# Patient Record
Sex: FEMALE | Race: WHITE
[De-identification: names, ages, dates, MRNs, and addresses within clinical notes are randomized per-mention and may not be internally consistent; named-entity substitution may affect disease eponyms.]

---

## 2020-05-13 ENCOUNTER — HOSPITAL ENCOUNTER (INPATIENT)
Dept: HOSPITAL 46 - MS | Age: 60
LOS: 8 days | Discharge: HOME | DRG: 340 | End: 2020-05-21
Attending: SURGERY | Admitting: SURGERY
Payer: COMMERCIAL

## 2020-05-13 VITALS — HEIGHT: 65 IN | BODY MASS INDEX: 34.16 KG/M2 | WEIGHT: 205.01 LBS

## 2020-05-13 DIAGNOSIS — Z79.899: ICD-10-CM

## 2020-05-13 DIAGNOSIS — K21.9: ICD-10-CM

## 2020-05-13 DIAGNOSIS — F41.8: ICD-10-CM

## 2020-05-13 DIAGNOSIS — E66.9: ICD-10-CM

## 2020-05-13 DIAGNOSIS — Z20.828: ICD-10-CM

## 2020-05-13 DIAGNOSIS — E87.6: ICD-10-CM

## 2020-05-13 DIAGNOSIS — E86.0: ICD-10-CM

## 2020-05-13 DIAGNOSIS — K35.33: Primary | ICD-10-CM

## 2020-05-13 PROCEDURE — C9113 INJ PANTOPRAZOLE SODIUM, VIA: HCPCS

## 2020-05-13 PROCEDURE — U0002 COVID-19 LAB TEST NON-CDC: HCPCS

## 2020-05-13 PROCEDURE — G0378 HOSPITAL OBSERVATION PER HR: HCPCS

## 2020-05-13 NOTE — XMS
Encounter Summary
  Created on: 2020
 
 Sarah Escamilla
 External Reference #: [id number]
 : 60
 Sex: Female
 
 Demographics
 
 
+-----------------------+----------------------+
| Address               | [address] |
|                       | [place]  [address] |
+-----------------------+----------------------+
| Home Phone            | [phone]      |
+-----------------------+----------------------+
| Preferred Language    | Unknown              |
+-----------------------+----------------------+
| Marital Status        |               |
+-----------------------+----------------------+
| Hindu Affiliation | Unknown              |
+-----------------------+----------------------+
| Race                  | Unknown              |
+-----------------------+----------------------+
| Ethnic Group          | Unknown              |
+-----------------------+----------------------+
 
 
 Author
 
 
+--------------+--------------------------------------------+
| Author       | [facility]  |
|              | and [facility]                                |
+--------------+--------------------------------------------+
| Organization | [facility]  |
|              | and [place]                                |
+--------------+--------------------------------------------+
| Address      | Unknown                                    |
+--------------+--------------------------------------------+
| Phone        | Unavailable                                |
+--------------+--------------------------------------------+
 
 
 
 Support
 
 
+------------------+--------------+---------+-----------------+
| Name             | Relationship | Address | Phone           |
+------------------+--------------+---------+-----------------+
| Alvino Escamilla | ECON         | Unknown | [phone] |
+------------------+--------------+---------+-----------------+
 
 
 
 Care Team Providers
 
 
 
+-----------------------+------+-------------+
| Care Team Member Name | Role | Phone       |
+-----------------------+------+-------------+
 PCP  | Unavailable |
+-----------------------+------+-------------+
 
 
 
 Encounter Details
 
 
+--------+-----------+----------------------+-----------+-------------+
| Date   | Type      | Department           | Care Team | Description |
+--------+-----------+----------------------+-----------+-------------+
| / | Hospital  |   [facility] |           |             |
|    | Encounter |  MED [facility] |           |             |
|        |           |   [address]       |           |             |
|        |           | [place]      |           |             |
|        |           | [address]           |           |             |
|        |           | [phone]         |           |             |
+--------+-----------+----------------------+-----------+-------------+
 
 
 
 Social History
 
 
+----------------+-------+-----------+--------+------+
| Tobacco Use    | Types | Packs/Day | Years  | Date |
|                |       |           | Used   |      |
+----------------+-------+-----------+--------+------+
| Never Assessed |       |           |        |      |
+----------------+-------+-----------+--------+------+
 
 
 
+------------------+---------------+
| Sex Assigned at  | Date Recorded |
| Birth            |               |
+------------------+---------------+
| Not on file      |               |
+------------------+---------------+
 
 
 
+----------------+-------------+-------------+
| Job Start Date | Occupation  | Industry    |
+----------------+-------------+-------------+
| Not on file    | Not on file | Not on file |
+----------------+-------------+-------------+
 
 
 
+----------------+--------------+------------+
| Travel History | Travel Start | Travel End |
+----------------+--------------+------------+
 
 
 
 
+-------------------------------------+
| No recent travel history available. |
+-------------------------------------+
 documented as of this encounter
 
 Plan of Treatment
 Not on filedocumented as of this encounter
 
 Visit Diagnoses
 Not on filedocumented in this encounter

## 2020-05-13 NOTE — XMS
Encounter Summary
  Created on: 2020
 
 Sarah Escamilla
 External Reference #: [id number]
 : 60
 Sex: Female
 
 Demographics
 
 
+-----------------------+----------------------+
| Address               | [address] |
|                       | [place]  [address] |
+-----------------------+----------------------+
| Home Phone            | [phone]      |
+-----------------------+----------------------+
| Preferred Language    | Unknown              |
+-----------------------+----------------------+
| Marital Status        |               |
+-----------------------+----------------------+
| Yazdanism Affiliation | Unknown              |
+-----------------------+----------------------+
| Race                  | Unknown              |
+-----------------------+----------------------+
| Ethnic Group          | Unknown              |
+-----------------------+----------------------+
 
 
 Author
 
 
+--------------+--------------------------------------------+
| Author       | [facility] [place]  |
|              | and [place]                                |
+--------------+--------------------------------------------+
| Organization | [facility] [place]  |
|              | and [place]                                |
+--------------+--------------------------------------------+
| Address      | Unknown                                    |
+--------------+--------------------------------------------+
| Phone        | Unavailable                                |
+--------------+--------------------------------------------+
 
 
 
 Support
 
 
+------------------+--------------+---------+-----------------+
| Name             | Relationship | Address | Phone           |
+------------------+--------------+---------+-----------------+
| Alvino Escamilla | ECON         | Unknown | [phone] |
+------------------+--------------+---------+-----------------+
 
 
 
 Care Team Providers
 
 
 
+-----------------------+------+-----------------+
| Care Team Member Name | Role | Phone           |
+-----------------------+------+-----------------+
| Unknown, Doctor       | PCP  | [phone] |
+-----------------------+------+-----------------+
 
 
 
 Encounter Details
 
 
+--------+----------+----------------------+----------------------+-------------+
| Date   | Type     | Department           | Care Team            | Description |
+--------+----------+----------------------+----------------------+-------------+
| / | Imaging  |   [facility] |   Provider,          |             |
| 2017   | Exam     |  MED [facility] EXTERNAL    | [id number] |             |
|        |          | IMAGING  [address]       |  [address]        |             |
|        |          | [address] [place]     | [place] [id number]     |             |
|        |          | [place] [address] |                      |             |
|        |          |   [phone]       |                      |             |
+--------+----------+----------------------+----------------------+-------------+
 
 
 
 Social History
 
 
+----------------+-------+-----------+--------+------+
| Tobacco Use    | Types | Packs/Day | Years  | Date |
|                |       |           | Used   |      |
+----------------+-------+-----------+--------+------+
| Never Assessed |       |           |        |      |
+----------------+-------+-----------+--------+------+
 
 
 
+------------------+---------------+
| Sex Assigned at  | Date Recorded |
| Birth            |               |
+------------------+---------------+
| Not on file      |               |
+------------------+---------------+
 
 
 
+----------------+-------------+-------------+
| Job Start Date | Occupation  | Industry    |
+----------------+-------------+-------------+
| Not on file    | Not on file | Not on file |
+----------------+-------------+-------------+
 
 
 
+----------------+--------------+------------+
| Travel History | Travel Start | Travel End |
+----------------+--------------+------------+
 
 
 
 
+-------------------------------------+
| No recent travel history available. |
+-------------------------------------+
 documented as of this encounter
 
 Plan of Treatment
 Not on filedocumented as of this encounter
 
 Procedures
 
 
+---------------------+--------+-------------+----------------------+----------------------+
| Procedure Name      | Priori | Date/Time   | Associated Diagnosis | Comments             |
|                     | ty     |             |                      |                      |
+---------------------+--------+-------------+----------------------+----------------------+
| CLYDE DIGITAL         | Routin | 09/10/2014  |                      |   Results for this   |
| SCREENING BILATERAL | e      | 10:45 AM    |                      | procedure are in the |
|                     |        | PDT         |                      |  results section.    |
+---------------------+--------+-------------+----------------------+----------------------+
 documented in this encounter
 
 Results
 CLYDE Digital Screening Bilateral (09/10/2014 10:45 AM PDT)
 
+----------+
| Specimen |
+----------+
|          |
+----------+
 
 
 
+------------------------------------------------------------------------+---------------+
| Narrative                                                              | Performed At  |
+------------------------------------------------------------------------+---------------+
|   This result has an attachment that is not available.  External films |   [facility] |
|  for comparison only - no result from [place].                      |               |
+------------------------------------------------------------------------+---------------+
 
 
 
+---------------+---------+--------------------+--------------+
| Performing    | Address | City/State/Zipcode | Phone Number |
| Organization  |         |                    |              |
+---------------+---------+--------------------+--------------+
|   [facility] |         |                    |              |
+---------------+---------+--------------------+--------------+
 documented in this encounter
 
 Visit Diagnoses
 Not on filedocumented in this encounter

## 2020-05-13 NOTE — XMS
Encounter Summary
  Created on: 2020
 
 Sarah Escamilla
 External Reference #: [id number]
 : 60
 Sex: Female
 
 Demographics
 
 
+-----------------------+----------------------+
| Address               | [address] |
|                       | [place]  [address] |
+-----------------------+----------------------+
| Home Phone            | [phone]      |
+-----------------------+----------------------+
| Preferred Language    | Unknown              |
+-----------------------+----------------------+
| Marital Status        |               |
+-----------------------+----------------------+
| Tenriism Affiliation | Unknown              |
+-----------------------+----------------------+
| Race                  | Unknown              |
+-----------------------+----------------------+
| Ethnic Group          | Unknown              |
+-----------------------+----------------------+
 
 
 Author
 
 
+--------------+--------------------------------------------+
| Author       | [facility] [place]  |
|              | and [place]                                |
+--------------+--------------------------------------------+
| Organization | [facility] [place]  |
|              | and [place]                                |
+--------------+--------------------------------------------+
| Address      | Unknown                                    |
+--------------+--------------------------------------------+
| Phone        | Unavailable                                |
+--------------+--------------------------------------------+
 
 
 
 Support
 
 
+------------------+--------------+---------+-----------------+
| Name             | Relationship | Address | Phone           |
+------------------+--------------+---------+-----------------+
| Alvino Escamilla | ECON         | Unknown | [phone] |
+------------------+--------------+---------+-----------------+
 
 
 
 Care Team Providers
 
 
 
+-----------------------+------+-----------------+
| Care Team Member Name | Role | Phone           |
+-----------------------+------+-----------------+
| Unknown, Doctor       | PCP  | [phone] |
+-----------------------+------+-----------------+
 
 
 
 Encounter Details
 
 
+--------+----------+----------------------+----------------------+-------------+
| Date   | Type     | Department           | Care Team            | Description |
+--------+----------+----------------------+----------------------+-------------+
| / | Imaging  |   [facility] |   Provider,          |             |
| 2017   | Exam     |  MED CTR EXTERNAL    | [id number] |             |
|        |          | IMAGING  [address]       |  [address]        |             |
|        |          | [address] [place]     | [place] [address]     |             |
|        |          | [place] [address] |                      |             |
|        |          |   [phone]       |                      |             |
+--------+----------+----------------------+----------------------+-------------+
 
 
 
 Social History
 
 
+----------------+-------+-----------+--------+------+
| Tobacco Use    | Types | Packs/Day | Years  | Date |
|                |       |           | Used   |      |
+----------------+-------+-----------+--------+------+
| Never Assessed |       |           |        |      |
+----------------+-------+-----------+--------+------+
 
 
 
+------------------+---------------+
| Sex Assigned at  | Date Recorded |
| Birth            |               |
+------------------+---------------+
| Not on file      |               |
+------------------+---------------+
 
 
 
+----------------+-------------+-------------+
| Job Start Date | Occupation  | Industry    |
+----------------+-------------+-------------+
| Not on file    | Not on file | Not on file |
+----------------+-------------+-------------+
 
 
 
+----------------+--------------+------------+
| Travel History | Travel Start | Travel End |
+----------------+--------------+------------+
 
 
 
 
+-------------------------------------+
| No recent travel history available. |
+-------------------------------------+
 documented as of this encounter
 
 Plan of Treatment
 Not on filedocumented as of this encounter
 
 Procedures
 
 
+-----------------+--------+-------------+----------------------+----------------------+
| Procedure Name  | Priori | Date/Time   | Associated Diagnosis | Comments             |
|                 | ty     |             |                      |                      |
+-----------------+--------+-------------+----------------------+----------------------+
| CLYDE DIGITAL     | Routin | 2015  |                      |   Results for this   |
| DIAGNOSTIC LEFT | e      |  2:45 PM    |                      | procedure are in the |
|                 |        | PST         |                      |  results section.    |
+-----------------+--------+-------------+----------------------+----------------------+
 documented in this encounter
 
 Results
 CLYDE Digital Diagnostic Left (2015  2:45 PM PST)
 
+----------+
| Specimen |
+----------+
|          |
+----------+
 
 
 
+------------------------------------------------------------------------+---------------+
| Narrative                                                              | Performed At  |
+------------------------------------------------------------------------+---------------+
|   This result has an attachment that is not available.  External films |   [facility] |
|  for comparison only - no result from [place].                      |               |
+------------------------------------------------------------------------+---------------+
 
 
 
+---------------+---------+--------------------+--------------+
| Performing    | Address | City/State/Zipcode | Phone Number |
| Organization  |         |                    |              |
+---------------+---------+--------------------+--------------+
|   [facility] |         |                    |              |
+---------------+---------+--------------------+--------------+
 documented in this encounter
 
 Visit Diagnoses
 Not on filedocumented in this encounter

## 2020-05-13 NOTE — XMS
Encounter Summary
  Created on: 2020
 
 Sarah Escamilla
 External Reference #: [id number]
 : 60
 Sex: Female
 
 Demographics
 
 
+-----------------------+----------------------+
| Address               | [address] |
|                       | [place]  [address] |
+-----------------------+----------------------+
| Home Phone            | [phone]      |
+-----------------------+----------------------+
| Preferred Language    | Unknown              |
+-----------------------+----------------------+
| Marital Status        |               |
+-----------------------+----------------------+
| Lutheran Affiliation | Unknown              |
+-----------------------+----------------------+
| Race                  | Unknown              |
+-----------------------+----------------------+
| Ethnic Group          | Unknown              |
+-----------------------+----------------------+
 
 
 Author
 
 
+--------------+--------------------------------------------+
| Author       | [facility] [place]  |
|              | and [place]                                |
+--------------+--------------------------------------------+
| Organization | [facility] [place]  |
|              | and [place]                                |
+--------------+--------------------------------------------+
| Address      | Unknown                                    |
+--------------+--------------------------------------------+
| Phone        | Unavailable                                |
+--------------+--------------------------------------------+
 
 
 
 Support
 
 
+------------------+--------------+---------+-----------------+
| Name             | Relationship | Address | Phone           |
+------------------+--------------+---------+-----------------+
| Alvino Escamilla | ECON         | Unknown | [phone] |
+------------------+--------------+---------+-----------------+
 
 
 
 Care Team Providers
 
 
 
+-----------------------+------+-----------------+
| Care Team Member Name | Role | Phone           |
+-----------------------+------+-----------------+
| Unknown, Doctor       | PCP  | [phone] |
+-----------------------+------+-----------------+
 
 
 
 Encounter Details
 
 
+--------+----------+----------------------+----------------------+-------------+
| Date   | Type     | Department           | Care Team            | Description |
+--------+----------+----------------------+----------------------+-------------+
| / | Imaging  |   [facility] |   Provider,          |             |
| 2017   | Exam     |  MED CTR EXTERNAL    | [address] |             |
|        |          | IMAGING  [address]       |  [address]        |             |
|        |          | [address] [place]     | [place] [address]     |             |
|        |          | [place] [address] |                      |             |
|        |          |   [phone]       |                      |             |
+--------+----------+----------------------+----------------------+-------------+
 
 
 
 Social History
 
 
+----------------+-------+-----------+--------+------+
| Tobacco Use    | Types | Packs/Day | Years  | Date |
|                |       |           | Used   |      |
+----------------+-------+-----------+--------+------+
| Never Assessed |       |           |        |      |
+----------------+-------+-----------+--------+------+
 
 
 
+------------------+---------------+
| Sex Assigned at  | Date Recorded |
| Birth            |               |
+------------------+---------------+
| Not on file      |               |
+------------------+---------------+
 
 
 
+----------------+-------------+-------------+
| Job Start Date | Occupation  | Industry    |
+----------------+-------------+-------------+
| Not on file    | Not on file | Not on file |
+----------------+-------------+-------------+
 
 
 
+----------------+--------------+------------+
| Travel History | Travel Start | Travel End |
+----------------+--------------+------------+
 
 
 
 
+-------------------------------------+
| No recent travel history available. |
+-------------------------------------+
 documented as of this encounter
 
 Plan of Treatment
 Not on filedocumented as of this encounter
 
 Procedures
 
 
+--------------------+--------+-------------+----------------------+----------------------+
| Procedure Name     | Priori | Date/Time   | Associated Diagnosis | Comments             |
|                    | ty     |             |                      |                      |
+--------------------+--------+-------------+----------------------+----------------------+
| US BREAST LIMITED  | Routin | 2016  |                      |   Results for this   |
| RIGHT              | e      | 10:45 AM    |                      | procedure are in the |
|                    |        | PDT         |                      |  results section.    |
+--------------------+--------+-------------+----------------------+----------------------+
 documented in this encounter
 
 Results
 US Breast Limited Right (2016 10:45 AM PDT)
 
+----------+
| Specimen |
+----------+
|          |
+----------+
 
 
 
+------------------------------------------------------------------------+---------------+
| Narrative                                                              | Performed At  |
+------------------------------------------------------------------------+---------------+
|   This result has an attachment that is not available.  External films |   [facility] |
|  for comparison only - no result from [place].                      |               |
+------------------------------------------------------------------------+---------------+
 
 
 
+---------------+---------+--------------------+--------------+
| Performing    | Address | City/State/Zipcode | Phone Number |
| Organization  |         |                    |              |
+---------------+---------+--------------------+--------------+
|   [facility] |         |                    |              |
+---------------+---------+--------------------+--------------+
 documented in this encounter
 
 Visit Diagnoses
 Not on filedocumented in this encounter

## 2020-05-13 NOTE — XMS
Encounter Summary
  Created on: 2020
 
 Sarah Escamilla
 External Reference #: [id number]
 : 60
 Sex: Female
 
 Demographics
 
 
+-----------------------+----------------------+
| Address               | [address] |
|                       | [place]  [address] |
+-----------------------+----------------------+
| Home Phone            | [phone]      |
+-----------------------+----------------------+
| Preferred Language    | Unknown              |
+-----------------------+----------------------+
| Marital Status        |               |
+-----------------------+----------------------+
| [employer]ist Affiliation | Unknown              |
+-----------------------+----------------------+
| Race                  | Unknown              |
+-----------------------+----------------------+
| Ethnic Group          | Unknown              |
+-----------------------+----------------------+
 
 
 Author
 
 
+--------------+--------------------------------------------+
| Author       | [facility]  |
|              | [facility]                                |
+--------------+--------------------------------------------+
| Organization | [facility]  |
|              | and [facility]                                |
+--------------+--------------------------------------------+
| Address      | Unknown                                    |
+--------------+--------------------------------------------+
| Phone        | Unavailable                                |
+--------------+--------------------------------------------+
 
 
 
 Support
 
 
+------------------+--------------+---------+-----------------+
| Name             | Relationship | Address | Phone           |
+------------------+--------------+---------+-----------------+
| Alvino Escamilla | ECON         | Unknown | [phone] |
+------------------+--------------+---------+-----------------+
 
 
 
 Care Team Providers
 
 
 
+-----------------------+------+-----------------+
| Care Team Member Name | Role | Phone           |
+-----------------------+------+-----------------+
| Unknown, Doctor       | PCP  | [phone] |
+-----------------------+------+-----------------+
 
 
 
 Encounter Details
 
 
+--------+----------+----------------------+----------------------+-------------+
| Date   | Type     | Department           | Care Team            | Description |
+--------+----------+----------------------+----------------------+-------------+
| / | Imaging  |   [facility] |   Provider,          |             |
| 2017   | Exam     |  [facility]    | [facility] [id number] |             |
|        |          | IMAGING  [address]       |  [address]        |             |
|        |          | [address] [place]     | [place] [id number]     |             |
|        |          | [place] [id number] |                      |             |
|        |          |   [phone]       |                      |             |
+--------+----------+----------------------+----------------------+-------------+
 
 
 
 Social History
 
 
+----------------+-------+-----------+--------+------+
| Tobacco Use    | Types | Packs/Day | Years  | Date |
|                |       |           | Used   |      |
+----------------+-------+-----------+--------+------+
| Never Assessed |       |           |        |      |
+----------------+-------+-----------+--------+------+
 
 
 
+------------------+---------------+
| Sex Assigned at  | Date Recorded |
| Birth            |               |
+------------------+---------------+
| Not on file      |               |
+------------------+---------------+
 
 
 
+----------------+-------------+-------------+
| Job Start Date | Occupation  | Industry    |
+----------------+-------------+-------------+
| Not on file    | Not on file | Not on file |
+----------------+-------------+-------------+
 
 
 
+----------------+--------------+------------+
| Travel History | Travel Start | Travel End |
+----------------+--------------+------------+
 
 
 
 
+-------------------------------------+
| No recent travel history available. |
+-------------------------------------+
 documented as of this encounter
 
 Plan of Treatment
 Not on filedocumented as of this encounter
 
 Procedures
 
 
+---------------------+--------+-------------+----------------------+----------------------+
| Procedure Name      | Priori | Date/Time   | Associated Diagnosis | Comments             |
|                     | ty     |             |                      |                      |
+---------------------+--------+-------------+----------------------+----------------------+
| CLYDE DIGITAL         | Routin | 10/26/2017  |                      |   Results for this   |
| SCREENING BILATERAL | e      | 11:40 AM    |                      | procedure are in the |
|                     |        | PDT         |                      |  results section.    |
+---------------------+--------+-------------+----------------------+----------------------+
 documented in this encounter
 
 Results
 CLYDE Digital Screening Bilateral (10/26/2017 11:40 AM PDT)
 
+----------+
| Specimen |
+----------+
|          |
+----------+
 
 
 
+------------------------------------------------------------------------+---------------+
| Narrative                                                              | Performed At  |
+------------------------------------------------------------------------+---------------+
|   This result has an attachment that is not available.  External films |   [facility] |
|  for comparison only - no result from [place].                      |               |
+------------------------------------------------------------------------+---------------+
 
 
 
+---------------+---------+--------------------+--------------+
| Performing    | Address | City/State/Zipcode | Phone Number |
| Organization  |         |                    |              |
+---------------+---------+--------------------+--------------+
|   [facility] |         |                    |              |
+---------------+---------+--------------------+--------------+
 documented in this encounter
 
 Visit Diagnoses
 Not on filedocumented in this encounter

## 2020-05-13 NOTE — XMS
Encounter Summary
  Created on: 2020
 
 Sarah Escamilla
 External Reference #: [id number]
 : 60
 Sex: Female
 
 Demographics
 
 
+-----------------------+----------------------+
| Address               | [address] |
|                       | [place]  [address] |
+-----------------------+----------------------+
| Home Phone            | [phone]      |
+-----------------------+----------------------+
| Preferred Language    | Unknown              |
+-----------------------+----------------------+
| Marital Status        |               |
+-----------------------+----------------------+
| Synagogue Affiliation | Unknown              |
+-----------------------+----------------------+
| Race                  | Unknown              |
+-----------------------+----------------------+
| Ethnic Group          | Unknown              |
+-----------------------+----------------------+
 
 
 Author
 
 
+--------------+--------------------------------------------+
| Author       | [facility]  |
|              | [facility]                                |
+--------------+--------------------------------------------+
| Organization | [facility]  |
|              | [facility]                                |
+--------------+--------------------------------------------+
| Address      | Unknown                                    |
+--------------+--------------------------------------------+
| Phone        | Unavailable                                |
+--------------+--------------------------------------------+
 
 
 
 Support
 
 
+------------------+--------------+---------+-----------------+
| Name             | Relationship | Address | Phone           |
+------------------+--------------+---------+-----------------+
| Alvino Escamilla | ECON         | Unknown | [phone] |
+------------------+--------------+---------+-----------------+
 
 
 
 Care Team Providers
 
 
 
+-----------------------+------+-----------------+
| Care Team Member Name | Role | Phone           |
+-----------------------+------+-----------------+
| Unknown, Doctor       | PCP  | [phone] |
+-----------------------+------+-----------------+
 
 
 
 Reason for Referral
 Diagnostic/Screening (Routine)
 
+--------+--------+-----------+--------------+--------------+---------------+
| Status | Reason | Specialty | Diagnoses /  | Referred By  | Referred To   |
|        |        |           | Procedures   | Contact      | Contact       |
+--------+--------+-----------+--------------+--------------+---------------+
| Closed |        | Radiology |   Diagnoses  |              |   [facility]     |
|        |        |           |  Breast      | [place],  | [address]  |
|        |        |           | density      | [place]       |  [place], |
|        |        |           | Procedures   | [place]       |  [place]           |
|        |        |           | MRI Breast   | DO [place]   | [id number]    |
|        |        |           | Bilateral w  | [address] | Phone:        |
|        |        |           | wo Contrast  |  [address]  [place]   | [phone]  |
|        |        |           |              | [place], [place]    |  Fax:         |
|        |        |           |              | [address]        | [phone]  |
|        |        |           |              | Phone:       |               |
|        |        |           |              | [phone] |               |
|        |        |           |              |   Fax:       |               |
|        |        |           |              | [phone] |               |
+--------+--------+-----------+--------------+--------------+---------------+
 
 
 
 
 Reason for Visit
 Auth/Cert
 
+--------+--------+-----------+--------------+--------------+--------------+
| Status | Reason | Specialty | Diagnoses /  | Referred By  | Referred To  |
|        |        |           | Procedures   | Contact      | Contact      |
+--------+--------+-----------+--------------+--------------+--------------+
|        |        |           |              |              |              |
+--------+--------+-----------+--------------+--------------+--------------+
 
 
 
 
 Encounter Details
 
 
+--------+-----------+----------------------+----------------------+----------------+
| Date   | Type      | Department           | Care Team            | Description    |
+--------+-----------+----------------------+----------------------+----------------+
| 01/10/ | Hospital  |   [facility] |   [facility] | Breast density |
| 2018   | Encounter |  [facility]  [address]  |  Ana Luisa Person DO   |                |
|        |           | [place]  [place], | [address]      |                |
|        |           |  [place] [address]       | [place], [place]      |                |
|        |           | [phone]         | [address] [phone]  |                |
|        |           |                      |  [phone] (Fax)  |                |
 
+--------+-----------+----------------------+----------------------+----------------+
 
 
 
 Social History
 
 
+----------------+-------+-----------+--------+------+
| Tobacco Use    | Types | Packs/Day | Years  | Date |
|                |       |           | Used   |      |
+----------------+-------+-----------+--------+------+
| Never Assessed |       |           |        |      |
+----------------+-------+-----------+--------+------+
 
 
 
+------------------+---------------+
| Sex Assigned at  | Date Recorded |
| Birth            |               |
+------------------+---------------+
| Not on file      |               |
+------------------+---------------+
 
 
 
+----------------+-------------+-------------+
| Job Start Date | Occupation  | Industry    |
+----------------+-------------+-------------+
| Not on file    | Not on file | Not on file |
+----------------+-------------+-------------+
 
 
 
+----------------+--------------+------------+
| Travel History | Travel Start | Travel End |
+----------------+--------------+------------+
 
 
 
+-------------------------------------+
| No recent travel history available. |
+-------------------------------------+
 documented as of this encounter
 
 Plan of Treatment
 Not on filedocumented as of this encounter
 
 Procedures
 
 
+----------------------+--------+-------------+----------------------+----------------------
+
| Procedure Name       | Priori | Date/Time   | Associated Diagnosis | Comments             
|
|                      | ty     |             |                      |                      
|
+----------------------+--------+-------------+----------------------+----------------------
+
| MRI BREAST BILATERAL | Routin | 01/10/2018  |   Breast density     |   Results for this   
|
 
|  W WO CONTRAST       | e      |  9:28 AM    |                      | procedure are in the 
|
|                      |        | PST         |                      |  results section.    
|
+----------------------+--------+-------------+----------------------+----------------------
+
 documented in this encounter
 
 Results
 MRI Breast Bilateral w wo Contrast (01/10/2018  9:28 AM PST)
 
+----------+
| Specimen |
+----------+
|          |
+----------+
 
 
 
+------------------------------------------------------------------------+---------------+
| Narrative                                                              | Performed At  |
+------------------------------------------------------------------------+---------------+
|   BILATERAL BREAST MRI WITH AND WITHOUT CONTRAST, WITH 3-D             |   [facility] |
| REFORMATIONS,  GADOLINIUM SUBTRACTION AND COMPUTER ASSISTED DIAGNOSIS, |               |
|  1/10/2018 8:46 AM     CLINICAL HISTORY: Left breast asymmetry on      |               |
| previous mammography without a  sonographic correlate.                 |               |
|   Postmenopausal, not reportedly on hormone replacement  therapy.   No |               |
|  reported family history of breast cancer.     COMPARISON: DIAGNOSTIC  |               |
| LEFT MAMMOGRAM AND LEFT BREAST ULTRASOUND 2017,          |               |
| BILATERAL DIGITAL SCREENING MAMMOGRAM 2017 AND MORE REMOTE     |               |
| EXAMS      TECHNIQUE:   The patient was imaged in the breast coil in   |               |
| the prone position in  the 3T magnet.   Axial STIR and T2 sequences    |               |
| were acquired.   Axial images were  acquired precontrast with          |               |
| sequential dynamic postcontrast acquisitions performed  as well        |               |
| following the uneventful intravenous administration of 20 cc           |               |
| Magnevist,  including high resolution sagittal views.   Postcontrast,  |               |
| subtracted axial images  were then acquired with motion registration.  |               |
|   Computer assisted diagnosis  utilizing CAD stream was performed.     |               |
|   Multiplanar MIP reformations were  performed.   Enhancement curves   |               |
| were acquired over suspicious lesions.     FINDINGS: Breast            |               |
| composition is almost entirely fat, with minimal scattered             |               |
| fibroglandular tissue bilaterally.   There is minimal background       |               |
| parenchymal  enhancement.   Within the central, slightly medial aspect |               |
|  of the left breast,  there is a 5.5 mm rounded, circumscribed nodule  |               |
| demonstrating central fat signal  consistent with the hilum of a lymph |               |
|  node.   The lesion demonstrates significant  enhancement but is       |               |
| similar in size, morphology and enhancement to a lesion more           |               |
| posterior and lateral in the breast at the same level.   These         |               |
| correspond with  small rounded asymmetries visible on multiple         |               |
| previous mammograms, and are  similar in appearance compared with      |               |
| multiple bilateral axillary lymph nodes.   Additional tiny rounded     |               |
| foci of subthreshold and progressive, benign type  enhancement are     |               |
| present elsewhere in both breasts.   No suspicious mass or             |               |
| architectural distortion is evident.   There is no abnormal cutaneous  |               |
| or chest  wall enhancement.   No morphologically abnormal or           |               |
| suspiciously enhancing  axillary or internal mammary lymph node is     |               |
| apparent.   A small hiatus hernia is  questioned.   Imaged             |               |
| intrathoracic and upper abdominal structures are otherwise             |               |
| unremarkable.     IMPRESSION -  1.    BI-RADS CATEGORY 2, BENIGN       |               |
| FINDINGS (BOTH BREASTS).   Two enhancing nodules  in the central to    |               |
 
| lateral left breast demonstrate morphology consistent with             |               |
| intramammary lymph nodes and correspond with stable asymmetries on     |               |
| previous  mammography.   There is no suspicious mass or other evidence |               |
|  of malignancy in  either breast.        RECOMMENDATION: Continued     |               |
| screening mammography, due in 2018.     Dictated and Signed    |               |
| by: Cesar Boyle MD    Electronically signed: 1/10/2018 11:45 AM      |               |
+------------------------------------------------------------------------+---------------+
 
 
 
+------------------------------------------------------------------------------------------+
| Procedure Note                                                                           |
+------------------------------------------------------------------------------------------+
|   [place], Rad Results In - 01/10/2018 11:48 AM PST  BILATERAL BREAST MRI WITH AND WITHOUT   |
| CONTRAST, WITH 3-D REFORMATIONS,GADOLINIUM SUBTRACTION AND COMPUTER ASSISTED DIAGNOSIS,  |
| 1/10/2018 8:46 AMCLINICAL HISTORY: Left breast asymmetry on previous mammography without |
|  asonographic correlate.  Postmenopausal, not reportedly on hormone replacementtherapy.  |
|  No reported family history of breast cancer.COMPARISON: DIAGNOSTIC LEFT MAMMOGRAM AND   |
| LEFT BREAST ULTRASOUND , BILATERAL DIGITAL SCREENING MAMMOGRAM OCTOBER   |
|  AND MORE REMOTE EXAMS TECHNIQUE:  The patient was imaged in the breast coil in the  |
| prone position inthe 3T magnet.  Axial STIR and T2 sequences were acquired.  Axial       |
| images wereacquired precontrast with sequential dynamic postcontrast acquisitions        |
| performedas well following the uneventful intravenous administration of 20 cc            |
| Magnevist,including high resolution sagittal views.  Postcontrast, subtracted axial      |
| imageswere then acquired with motion registration.  Computer assisted diagnosisutilizing |
|  CAD stream was performed.  Multiplanar MIP reformations wereperformed.  Enhancement     |
| curves were acquired over suspicious lesions.FINDINGS: Breast composition is almost      |
| entirely fat, with minimal scatteredfibroglandular tissue bilaterally.  There is minimal |
|  background parenchymalenhancement.  Within the central, slightly medial aspect of the   |
| left breast,there is a 5.5 mm rounded, circumscribed nodule demonstrating central fat    |
| signalconsistent with the hilum of a lymph node.  The lesion demonstrates                |
| significantenhancement but is similar in size, morphology and enhancement to a lesion    |
| moreposterior and lateral in the breast at the same level.  These correspond withsmall   |
| rounded asymmetries visible on multiple previous mammograms, and aresimilar in           |
| appearance compared with multiple bilateral axillary lymph nodes. Additional tiny        |
| rounded foci of subthreshold and progressive, benign typeenhancement are present         |
| elsewhere in both breasts.  No suspicious mass orarchitectural distortion is evident.    |
| There is no abnormal cutaneous or chestwall enhancement.  No morphologically abnormal or |
|  suspiciously enhancingaxillary or internal mammary lymph node is apparent.  A small     |
| hiatus hernia isquestioned.  Imaged intrathoracic and upper abdominal structures are     |
| otherwiseunremarkable.IMPRESSION -1.   BI-RADS CATEGORY 2, BENIGN FINDINGS (BOTH         |
| BREASTS).  Two enhancing nodulesin the central to lateral left breast demonstrate        |
| morphology consistent withintramammary lymph nodes and correspond with stable            |
| asymmetries on previousmammography.  There is no suspicious mass or other evidence of    |
| malignancy ineither breast.  RECOMMENDATION: Continued screening mammography, due in     |
| 2018.Dictated and Signed by: Cesar Boyle MD  Electronically signed: 1/10/2018   |
| 11:45 AM                                                                                 |
|unremarkable.                                                                            |
|                                                                                          |
|IMPRESSION -                                                                              |
|1.   BI-RADS CATEGORY 2, BENIGN FINDINGS (BOTH BREASTS).  Two enhancing nodules           |
|in the central to lateral left breast demonstrate morphology consistent with              |
|intramammary lymph nodes and correspond with stable asymmetries on previous               |
|mammography.  There is no suspicious mass or other evidence of malignancy in             |
|either breast.                                                                            |
|                                                                                          |
|RECOMMENDATION: Continued screening mammography, due in 2018.                     |
|                                                                                          |
|Dictated and Signed by: Cesar Boyle MD                                                   |
| Electronically signed: 1/10/2018 11:45 AM                                                |
 
+------------------------------------------------------------------------------------------+
 
 
 
+---------------+---------+--------------------+--------------+
| Performing    | Address | City/State/Zipcode | Phone Number |
| Organization  |         |                    |              |
+---------------+---------+--------------------+--------------+
|   [facility] |         |                    |              |
+---------------+---------+--------------------+--------------+
 documented in this encounter
 
 Visit Diagnoses
 
 
+----------------------------------------------------+
| Diagnosis                                          |
+----------------------------------------------------+
|   Breast density  Other sign and symptom in breast |
+----------------------------------------------------+
 documented in this encounter
 
 Administered Medications
 
 
+-----------------------------------+--------+----------+--------+------+------+
| Medication Order                  | MAR    | Action   | Dose   | Rate | Site |
|                                   | Action | Date     |        |      |      |
+-----------------------------------+--------+----------+--------+------+------+
|   gadopentetate (MAGNEVIST)       | Given  | 01/10/20 | 20 mLs |      |      |
| injection 20 mL  20 mL,           |        | 18  9:18 |        |      |      |
| Intravenous, ONCE PRN, Other,     |        |  AM PST  |        |      |      |
| Starting Wed 1/10/18 at 0917, For |        |          |        |      |      |
|  1 dose, MRI                      |        |          |        |      |      |
+-----------------------------------+--------+----------+--------+------+------+
 
 
 
+---+---+
|   |   |
+---+---+
 documented in this encounter

## 2020-05-13 NOTE — XMS
Encounter Summary
  Created on: 2020
 
 Sarah Escamilla
 External Reference #: [id number]
 : 60
 Sex: Female
 
 Demographics
 
 
+-----------------------+----------------------+
| Address               | [address] |
|                       | [place]  [address] |
+-----------------------+----------------------+
| Home Phone            | [phone]      |
+-----------------------+----------------------+
| Preferred Language    | Unknown              |
+-----------------------+----------------------+
| Marital Status        |               |
+-----------------------+----------------------+
| Tenriism Affiliation | Unknown              |
+-----------------------+----------------------+
| Race                  | Unknown              |
+-----------------------+----------------------+
| Ethnic Group          | Unknown              |
+-----------------------+----------------------+
 
 
 Author
 
 
+--------------+--------------------------------------------+
| Author       | [facility]  |
|              | and [facility]                                |
+--------------+--------------------------------------------+
| Organization | [facility]  |
|              | and [facility]                                |
+--------------+--------------------------------------------+
| Address      | Unknown                                    |
+--------------+--------------------------------------------+
| Phone        | Unavailable                                |
+--------------+--------------------------------------------+
 
 
 
 Support
 
 
+------------------+--------------+---------+-----------------+
| Name             | Relationship | Address | Phone           |
+------------------+--------------+---------+-----------------+
| Alvino Escamilla | ECON         | Unknown | [phone] |
+------------------+--------------+---------+-----------------+
 
 
 
 Care Team Providers
 
 
 
+-----------------------+------+-------------+
| Care Team Member Name | Role | Phone       |
+-----------------------+------+-------------+
 PCP  | Unavailable |
+-----------------------+------+-------------+
 
 
 
 Encounter Details
 
 
+--------+----------+----------------------+----------------------+-------------+
| Date   | Type     | Department           | Care Team            | Description |
+--------+----------+----------------------+----------------------+-------------+
| / | Imaging  |   [facility] |   Provider,          |             |
|    | Exam     |  [facility]    | [facility]  [id number] |             |
|        |          | IMAGING  [address]       |  [address]        |             |
|        |          | [address]  [place]     | [place] [id number]     |             |
|        |          | [place] [address] |                      |             |
|        |          |   [phone]       |                      |             |
+--------+----------+----------------------+----------------------+-------------+
 
 
 
 Social History
 
 
+----------------+-------+-----------+--------+------+
| Tobacco Use    | Types | Packs/Day | Years  | Date |
|                |       |           | Used   |      |
+----------------+-------+-----------+--------+------+
| Never Assessed |       |           |        |      |
+----------------+-------+-----------+--------+------+
 
 
 
+------------------+---------------+
| Sex Assigned at  | Date Recorded |
| Birth            |               |
+------------------+---------------+
| Not on file      |               |
+------------------+---------------+
 
 
 
+----------------+-------------+-------------+
| Job Start Date | Occupation  | Industry    |
+----------------+-------------+-------------+
| Not on file    | Not on file | Not on file |
+----------------+-------------+-------------+
 
 
 
+----------------+--------------+------------+
| Travel History | Travel Start | Travel End |
+----------------+--------------+------------+
 
 
 
 
+-------------------------------------+
| No recent travel history available. |
+-------------------------------------+
 documented as of this encounter
 
 Plan of Treatment
 Not on filedocumented as of this encounter
 
 Procedures
 
 
+---------------------+--------+-------------+----------------------+----------------------+
| Procedure Name      | Priori | Date/Time   | Associated Diagnosis | Comments             |
|                     | ty     |             |                      |                      |
+---------------------+--------+-------------+----------------------+----------------------+
| CLYDE DIGITAL         | Routin | 2008  |                      |   Results for this   |
| SCREENING BILATERAL | e      | 11:20 AM    |                      | procedure are in the |
|                     |        | PDT         |                      |  results section.    |
+---------------------+--------+-------------+----------------------+----------------------+
 documented in this encounter
 
 Results
 CLYDE Digital Screening Bilateral (2008 11:20 AM PDT)
 
+----------+
| Specimen |
+----------+
|          |
+----------+
 
 
 
+------------------------------------------------------------------------+---------------+
| Narrative                                                              | Performed At  |
+------------------------------------------------------------------------+---------------+
|   This result has an attachment that is not available.  External films |   [facility] |
|  for comparison only - no result from [place].                      |               |
+------------------------------------------------------------------------+---------------+
 
 
 
+---------------+---------+--------------------+--------------+
| Performing    | Address | City/State/Zipcode | Phone Number |
| Organization  |         |                    |              |
+---------------+---------+--------------------+--------------+
|   [facility] |         |                    |              |
+---------------+---------+--------------------+--------------+
 documented in this encounter
 
 Visit Diagnoses
 Not on filedocumented in this encounter

## 2020-05-13 NOTE — XMS
Encounter Summary
  Created on: 2020
 
 Sarah Escamilla
 External Reference #: [id number]
 : 60
 Sex: Female
 
 Demographics
 
 
+-----------------------+----------------------+
| Address               | [address] |
|                       | [place]  [address] |
+-----------------------+----------------------+
| Home Phone            | [phone]      |
+-----------------------+----------------------+
| Preferred Language    | Unknown              |
+-----------------------+----------------------+
| Marital Status        |               |
+-----------------------+----------------------+
| Gnosticism Affiliation | Unknown              |
+-----------------------+----------------------+
| Race                  | Unknown              |
+-----------------------+----------------------+
| Ethnic Group          | Unknown              |
+-----------------------+----------------------+
 
 
 Author
 
 
+--------------+--------------------------------------------+
| Author       | [facility]  |
|              | and [facility]                                |
+--------------+--------------------------------------------+
| Organization | [facility]  |
|              | and [facility]                                |
+--------------+--------------------------------------------+
| Address      | Unknown                                    |
+--------------+--------------------------------------------+
| Phone        | Unavailable                                |
+--------------+--------------------------------------------+
 
 
 
 Support
 
 
+------------------+--------------+---------+-----------------+
| Name             | Relationship | Address | Phone           |
+------------------+--------------+---------+-----------------+
| Alvino Escamilla | ECON         | Unknown | [phone] |
+------------------+--------------+---------+-----------------+
 
 
 
 Care Team Providers
 
 
 
+-----------------------+------+-----------------+
| Care Team Member Name | Role | Phone           |
+-----------------------+------+-----------------+
| Unknown, Doctor       | PCP  | [phone] |
+-----------------------+------+-----------------+
 
 
 
 Encounter Details
 
 
+--------+----------+----------------------+----------------------+-------------+
| Date   | Type     | Department           | Care Team            | Description |
+--------+----------+----------------------+----------------------+-------------+
| / | Imaging  |   [facility] |   Provider,          |             |
| 2017   | Exam     |  [facility]    | [facility]  [id number] |             |
|        |          | [address]AG[address]  [address]       |  [address]        |             |
|        |          | [address] [place]     | [place] [address]     |             |
|        |          | [place] [address] |                      |             |
|        |          |   [phone]       |                      |             |
+--------+----------+----------------------+----------------------+-------------+
 
 
 
 Social History
 
 
+----------------+-------+-----------+--------+------+
| Tobacco Use    | Types | Packs/Day | Years  | Date |
|                |       |           | Used   |      |
+----------------+-------+-----------+--------+------+
| Never Assessed |       |           |        |      |
+----------------+-------+-----------+--------+------+
 
 
 
+------------------+---------------+
| Sex Assigned at  | Date Recorded |
| Birth            |               |
+------------------+---------------+
| Not on file      |               |
+------------------+---------------+
 
 
 
+----------------+-------------+-------------+
| Job Start Date | Occupation  | Industry    |
+----------------+-------------+-------------+
| Not on file    | Not on file | Not on file |
+----------------+-------------+-------------+
 
 
 
+----------------+--------------+------------+
| Travel History | Travel Start | Travel End |
+----------------+--------------+------------+
 
 
 
 
+-------------------------------------+
| No recent travel history available. |
+-------------------------------------+
 documented as of this encounter
 
 Plan of Treatment
 Not on filedocumented as of this encounter
 
 Procedures
 
 
+--------------------+--------+-------------+----------------------+----------------------+
| Procedure Name     | Priori | Date/Time   | Associated Diagnosis | Comments             |
|                    | ty     |             |                      |                      |
+--------------------+--------+-------------+----------------------+----------------------+
| US BREAST LIMITED  | Routin | 2017  |                      |   Results for this   |
| LEFT               | e      | 11:10 AM    |                      | procedure are in the |
|                    |        | PST         |                      |  results section.    |
+--------------------+--------+-------------+----------------------+----------------------+
 documented in this encounter
 
 Results
 US Breast Limited Left (2017 11:10 AM PST)
 
+----------+
| Specimen |
+----------+
|          |
+----------+
 
 
 
+------------------------------------------------------------------------+---------------+
| Narrative                                                              | Performed At  |
+------------------------------------------------------------------------+---------------+
|   This result has an attachment that is not available.  External films |   [facility] |
|  for comparison only - no result from [place].                      |               |
+------------------------------------------------------------------------+---------------+
 
 
 
+---------------+---------+--------------------+--------------+
| Performing    | Address | City/State/Zipcode | Phone Number |
| Organization  |         |                    |              |
+---------------+---------+--------------------+--------------+
|   [facility] |         |                    |              |
+---------------+---------+--------------------+--------------+
 documented in this encounter
 
 Visit Diagnoses
 Not on filedocumented in this encounter

## 2020-05-13 NOTE — XMS
Encounter Summary
  Created on: 2020
 
 Sarah Escamilla
 External Reference #: [id number]
 : 60
 Sex: Female
 
 Demographics
 
 
+-----------------------+----------------------+
| Address               | [address] |
|                       | [place]  [address] |
+-----------------------+----------------------+
| Home Phone            | [phone]      |
+-----------------------+----------------------+
| Preferred Language    | Unknown              |
+-----------------------+----------------------+
| Marital Status        |               |
+-----------------------+----------------------+
| Jain Affiliation | Unknown              |
+-----------------------+----------------------+
| Race                  | Unknown              |
+-----------------------+----------------------+
| Ethnic Group          | Unknown              |
+-----------------------+----------------------+
 
 
 Author
 
 
+--------------+--------------------------------------------+
| Author       | [facility]  |
|              | and [facility]                                |
+--------------+--------------------------------------------+
| Organization | [facility]  |
|              | and [facility]                                |
+--------------+--------------------------------------------+
| Address      | Unknown                                    |
+--------------+--------------------------------------------+
| Phone        | Unavailable                                |
+--------------+--------------------------------------------+
 
 
 
 Support
 
 
+------------------+--------------+---------+-----------------+
| Name             | Relationship | Address | Phone           |
+------------------+--------------+---------+-----------------+
| Alvino Escamilla | ECON         | Unknown | [phone] |
+------------------+--------------+---------+-----------------+
 
 
 
 Care Team Providers
 
 
 
+-----------------------+------+-----------------+
| Care Team Member Name | Role | Phone           |
+-----------------------+------+-----------------+
| Unknown, Doctor       | PCP  | [phone] |
+-----------------------+------+-----------------+
 
 
 
 Reason for Referral
 Diagnostic/Screening (Routine)
 
+--------+--------+-----------+--------------+--------------+---------------+
| Status | Reason | Specialty | Diagnoses /  | Referred By  | Referred To   |
|        |        |           | Procedures   | Contact      | Contact       |
+--------+--------+-----------+--------------+--------------+---------------+
| Closed |        | Radiology |   Diagnoses  |              |   [facility]     |
|        |        |           |  Breast      | [place],  | [address]  |
|        |        |           | density      | [place]       |  [place], |
|        |        |           | Procedures   | [place]       |  [place]           |
|        |        |           | MRI Breast   | [place]   | [id number]    |
|        |        |           | Bilateral w  | [address] | Phone:        |
|        |        |           | wo Contrast  |  [address]  [place]   | [phone]  |
|        |        |           |              | [place], [place]    |  Fax:         |
|        |        |           |              | [address]        | [phone]  |
|        |        |           |              | Phone:       |               |
|        |        |           |              | [phone] |               |
|        |        |           |              |   Fax:       |               |
|        |        |           |              | [phone] |               |
+--------+--------+-----------+--------------+--------------+---------------+
 
 
 
 
 Reason for Visit
 Auth/Cert
 
+--------+--------+-----------+--------------+--------------+--------------+
| Status | Reason | Specialty | Diagnoses /  | Referred By  | Referred To  |
|        |        |           | Procedures   | Contact      | Contact      |
+--------+--------+-----------+--------------+--------------+--------------+
|        |        |           |              |              |              |
+--------+--------+-----------+--------------+--------------+--------------+
 
 
 
 
 Encounter Details
 
 
+--------+-----------+----------------------+----------------------+----------------+
| Date   | Type      | Department           | Care Team            | Description    |
+--------+-----------+----------------------+----------------------+----------------+
| 01/10/ | Hospital  |   [facility] |   [facility] | Breast density |
| 2018   | Encounter |  [facility] MRI  [address]  |  Ana Luisa Person DO   |                |
|        |           | [address]  [place], | [address]      |                |
|        |           |  [place] [address]       | [place], [place]      |                |
|        |           | [phone]         | [address] [phone]  |                |
|        |           |                      |  [phone] (Fax)  |                |
 
+--------+-----------+----------------------+----------------------+----------------+
 
 
 
 Social History
 
 
+----------------+-------+-----------+--------+------+
| Tobacco Use    | Types | Packs/Day | Years  | Date |
|                |       |           | Used   |      |
+----------------+-------+-----------+--------+------+
| Never Assessed |       |           |        |      |
+----------------+-------+-----------+--------+------+
 
 
 
+------------------+---------------+
| Sex Assigned at  | Date Recorded |
| Birth            |               |
+------------------+---------------+
| Not on file      |               |
+------------------+---------------+
 
 
 
+----------------+-------------+-------------+
| Job Start Date | Occupation  | Industry    |
+----------------+-------------+-------------+
| Not on file    | Not on file | Not on file |
+----------------+-------------+-------------+
 
 
 
+----------------+--------------+------------+
| Travel History | Travel Start | Travel End |
+----------------+--------------+------------+
 
 
 
+-------------------------------------+
| No recent travel history available. |
+-------------------------------------+
 documented as of this encounter
 
 Plan of Treatment
 Not on filedocumented as of this encounter
 
 Procedures
 
 
+----------------------+--------+-------------+----------------------+----------------------
+
| Procedure Name       | Priori | Date/Time   | Associated Diagnosis | Comments             
|
|                      | ty     |             |                      |                      
|
+----------------------+--------+-------------+----------------------+----------------------
+
| MRI BREAST BILATERAL | Routin | 01/10/2018  |   Breast density     |   Results for this   
|
 
|  W WO CONTRAST       | e      |  9:28 AM    |                      | procedure are in the 
|
|                      |        | PST         |                      |  results section.    
|
+----------------------+--------+-------------+----------------------+----------------------
+
 documented in this encounter
 
 Results
 MRI Breast Bilateral w wo Contrast (01/10/2018  9:28 AM PST)
 
+----------+
| Specimen |
+----------+
|          |
+----------+
 
 
 
+------------------------------------------------------------------------+---------------+
| Narrative                                                              | Performed At  |
+------------------------------------------------------------------------+---------------+
|   BILATERAL BREAST MRI WITH AND WITHOUT CONTRAST, WITH 3-D             |   [facility] |
| REFORMATIONS,  GADOLINIUM SUBTRACTION AND COMPUTER ASSISTED DIAGNOSIS, |               |
|  1/10/2018 8:46 AM     CLINICAL HISTORY: Left breast asymmetry on      |               |
| previous mammography without a  sonographic correlate.                 |               |
|   Postmenopausal, not reportedly on hormone replacement  therapy.   No |               |
|  reported family history of breast cancer.     COMPARISON: DIAGNOSTIC  |               |
| LEFT MAMMOGRAM AND LEFT BREAST ULTRASOUND 2017,          |               |
| BILATERAL DIGITAL SCREENING MAMMOGRAM 2017 AND MORE REMOTE     |               |
| EXAMS      TECHNIQUE:   The patient was imaged in the breast coil in   |               |
| the prone position in  the 3T magnet.   Axial STIR and T2 sequences    |               |
| were acquired.   Axial images were  acquired precontrast with          |               |
| sequential dynamic postcontrast acquisitions performed  as well        |               |
| following the uneventful intravenous administration of 20 cc           |               |
| Magnevist,  including high resolution sagittal views.   Postcontrast,  |               |
| subtracted axial images  were then acquired with motion registration.  |               |
|   Computer assisted diagnosis  utilizing CAD stream was performed.     |               |
|   Multiplanar MIP reformations were  performed.   Enhancement curves   |               |
| were acquired over suspicious lesions.     FINDINGS: Breast            |               |
| composition is almost entirely fat, with minimal scattered             |               |
| fibroglandular tissue bilaterally.   There is minimal background       |               |
| parenchymal  enhancement.   Within the central, slightly medial aspect |               |
|  of the left breast,  there is a 5.5 mm rounded, circumscribed nodule  |               |
| demonstrating central fat signal  consistent with the hilum of a lymph |               |
|  node.   The lesion demonstrates significant  enhancement but is       |               |
| similar in size, morphology and enhancement to a lesion more           |               |
| posterior and lateral in the breast at the same level.   These         |               |
| correspond with  small rounded asymmetries visible on multiple         |               |
| previous mammograms, and are  similar in appearance compared with      |               |
| multiple bilateral axillary lymph nodes.   Additional tiny rounded     |               |
| foci of subthreshold and progressive, benign type  enhancement are     |               |
| present elsewhere in both breasts.   No suspicious mass or             |               |
| architectural distortion is evident.   There is no abnormal cutaneous  |               |
| or chest  wall enhancement.   No morphologically abnormal or           |               |
| suspiciously enhancing  axillary or internal mammary lymph node is     |               |
| apparent.   A small hiatus hernia is  questioned.   Imaged             |               |
| intrathoracic and upper abdominal structures are otherwise             |               |
| unremarkable.     IMPRESSION -  1.    BI-RADS CATEGORY 2, BENIGN       |               |
| FINDINGS (BOTH BREASTS).   Two enhancing nodules  in the central to    |               |
 
| lateral left breast demonstrate morphology consistent with             |               |
| intramammary lymph nodes and correspond with stable asymmetries on     |               |
| previous  mammography.   There is no suspicious mass or other evidence |               |
|  of malignancy in  either breast.        RECOMMENDATION: Continued     |               |
| screening mammography, due in 2018.     Dictated and Signed    |               |
| by: Cesar Boyle MD    Electronically signed: 1/10/2018 11:45 AM      |               |
+------------------------------------------------------------------------+---------------+
 
 
 
+------------------------------------------------------------------------------------------+
| Procedure Note                                                                           |
+------------------------------------------------------------------------------------------+
|   [place], Rad Results In - 01/10/2018 11:48 AM PST  BILATERAL BREAST MRI WITH AND WITHOUT   |
| CONTRAST, WITH 3-D REFORMATIONS,GADOLINIUM SUBTRACTION AND COMPUTER ASSISTED DIAGNOSIS,  |
| 1/10/2018 8:46 AMCLINICAL HISTORY: Left breast asymmetry on previous mammography without |
|  asonographic correlate.  Postmenopausal, not reportedly on hormone replacementtherapy.  |
|  No reported family history of breast cancer.COMPARISON: DIAGNOSTIC LEFT MAMMOGRAM AND   |
| LEFT BREAST ULTRASOUND , BILATERAL DIGITAL SCREENING MAMMOGRAM OCTOBER   |
|  AND MORE REMOTE EXAMS TECHNIQUE:  The patient was imaged in the breast coil in the  |
| prone position inthe 3T magnet.  Axial STIR and T2 sequences were acquired.  Axial       |
| images wereacquired precontrast with sequential dynamic postcontrast acquisitions        |
| performedas well following the uneventful intravenous administration of 20 cc            |
| Magnevist,including high resolution sagittal views.  Postcontrast, subtracted axial      |
| imageswere then acquired with motion registration.  Computer assisted diagnosisutilizing |
|  CAD stream was performed.  Multiplanar MIP reformations wereperformed.  Enhancement     |
| curves were acquired over suspicious lesions.FINDINGS: Breast composition is almost      |
| entirely fat, with minimal scatteredfibroglandular tissue bilaterally.  There is minimal |
|  background parenchymalenhancement.  Within the central, slightly medial aspect of the   |
| left breast,there is a 5.5 mm rounded, circumscribed nodule demonstrating central fat    |
| signalconsistent with the hilum of a lymph node.  The lesion demonstrates                |
| significantenhancement but is similar in size, morphology and enhancement to a lesion    |
| moreposterior and lateral in the breast at the same level.  These correspond withsmall   |
| rounded asymmetries visible on multiple previous mammograms, and aresimilar in           |
| appearance compared with multiple bilateral axillary lymph nodes. Additional tiny        |
| rounded foci of subthreshold and progressive, benign typeenhancement are present         |
| elsewhere in both breasts.  No suspicious mass orarchitectural distortion is evident.    |
| There is no abnormal cutaneous or chestwall enhancement.  No morphologically abnormal or |
|  suspiciously enhancingaxillary or internal mammary lymph node is apparent.  A small     |
| hiatus hernia isquestioned.  Imaged intrathoracic and upper abdominal structures are     |
| otherwiseunremarkable.IMPRESSION -1.   BI-RADS CATEGORY 2, BENIGN FINDINGS (BOTH         |
| BREASTS).  Two enhancing nodulesin the central to lateral left breast demonstrate        |
| morphology consistent withintramammary lymph nodes and correspond with stable            |
| asymmetries on previousmammography.  There is no suspicious mass or other evidence of    |
| malignancy ineither breast.  RECOMMENDATION: Continued screening mammography, due in     |
| 2018.Dictated and Signed by: Cesar Boyle MD  Electronically signed: 1/10/2018   |
| 11:45 AM                                                                                 |
|unremarkable.                                                                            |
|                                                                                          |
|IMPRESSION -                                                                              |
|1.   BI-RADS CATEGORY 2, BENIGN FINDINGS (BOTH BREASTS).  Two enhancing nodules           |
|in the central to lateral left breast demonstrate morphology consistent with              |
|intramammary lymph nodes and correspond with stable asymmetries on previous               |
|mammography.  There is no suspicious mass or other evidence of malignancy in             |
|either breast.                                                                            |
|                                                                                          |
|RECOMMENDATION: Continued screening mammography, due in 2018.                     |
|                                                                                          |
|Dictated and Signed by: Cesar Boyle MD                                                   |
| Electronically signed: 1/10/2018 11:45 AM                                                |
 
+------------------------------------------------------------------------------------------+
 
 
 
+---------------+---------+--------------------+--------------+
| Performing    | Address | City/State/Zipcode | Phone Number |
| Organization  |         |                    |              |
+---------------+---------+--------------------+--------------+
|   [facility] |         |                    |              |
+---------------+---------+--------------------+--------------+
 documented in this encounter
 
 Visit Diagnoses
 
 
+----------------------------------------------------+
| Diagnosis                                          |
+----------------------------------------------------+
|   Breast density  Other sign and symptom in breast |
+----------------------------------------------------+
 documented in this encounter
 
 Administered Medications
 
 
+-----------------------------------+--------+----------+--------+------+------+
| Medication Order                  | MAR    | Action   | Dose   | Rate | Site |
|                                   | Action | Date     |        |      |      |
+-----------------------------------+--------+----------+--------+------+------+
|   gadopentetate (MAGNEVIST)       | Given  | 01/10/20 | 20 mLs |      |      |
| injection 20 mL  20 mL,           |        | 18  9:18 |        |      |      |
| Intravenous, ONCE PRN, Other,     |        |  AM PST  |        |      |      |
| Starting Wed 1/10/18 at 0917, For |        |          |        |      |      |
|  1 dose, MRI                      |        |          |        |      |      |
+-----------------------------------+--------+----------+--------+------+------+
 
 
 
+---+---+
|   |   |
+---+---+
 documented in this encounter

## 2020-05-13 NOTE — XMS
Encounter Summary
  Created on: 2020
 
 Sarah Escamilla
 External Reference #: [id number]
 : 60
 Sex: Female
 
 Demographics
 
 
+-----------------------+----------------------+
| Address               | [address] |
|                       | [place]  [address] |
+-----------------------+----------------------+
| Home Phone            | [phone]      |
+-----------------------+----------------------+
| Preferred Language    | Unknown              |
+-----------------------+----------------------+
| Marital Status        |               |
+-----------------------+----------------------+
| Sabianism Affiliation | Unknown              |
+-----------------------+----------------------+
| Race                  | Unknown              |
+-----------------------+----------------------+
| Ethnic Group          | Unknown              |
+-----------------------+----------------------+
 
 
 Author
 
 
+--------------+--------------------------------------------+
| Author       | [facility]  |
|              | and [facility]                                |
+--------------+--------------------------------------------+
| Organization | [facility]  |
|              | and [place]                                |
+--------------+--------------------------------------------+
| Address      | Unknown                                    |
+--------------+--------------------------------------------+
| Phone        | Unavailable                                |
+--------------+--------------------------------------------+
 
 
 
 Support
 
 
+------------------+--------------+---------+-----------------+
| Name             | Relationship | Address | Phone           |
+------------------+--------------+---------+-----------------+
| Alvino Escamilla | ECON         | Unknown | [phone] |
+------------------+--------------+---------+-----------------+
 
 
 
 Care Team Providers
 
 
 
+-----------------------+------+-------------+
| Care Team Member Name | Role | Phone       |
+-----------------------+------+-------------+
 PCP  | Unavailable |
+-----------------------+------+-------------+
 
 
 
 Encounter Details
 
 
+--------+----------+----------------------+----------------------+-------------+
| Date   | Type     | Department           | Care Team            | Description |
+--------+----------+----------------------+----------------------+-------------+
| / | Imaging  |   [facility] |   Provider,          |             |
|    | Exam     |  MED [facility] EXTERNAL    | [facility]  [address] |             |
|        |          | IMAGING  [address]       |  [address]        |             |
|        |          | [address]  [place]     | [place] [id number]     |             |
|        |          | [place] [id number] |                      |             |
|        |          |   [phone]       |                      |             |
+--------+----------+----------------------+----------------------+-------------+
 
 
 
 Social History
 
 
+----------------+-------+-----------+--------+------+
| Tobacco Use    | Types | Packs/Day | Years  | Date |
|                |       |           | Used   |      |
+----------------+-------+-----------+--------+------+
| Never Assessed |       |           |        |      |
+----------------+-------+-----------+--------+------+
 
 
 
+------------------+---------------+
| Sex Assigned at  | Date Recorded |
| Birth            |               |
+------------------+---------------+
| Not on file      |               |
+------------------+---------------+
 
 
 
+----------------+-------------+-------------+
| Job Start Date | Occupation  | Industry    |
+----------------+-------------+-------------+
| Not on file    | Not on file | Not on file |
+----------------+-------------+-------------+
 
 
 
+----------------+--------------+------------+
| Travel History | Travel Start | Travel End |
+----------------+--------------+------------+
 
 
 
 
+-------------------------------------+
| No recent travel history available. |
+-------------------------------------+
 documented as of this encounter
 
 Plan of Treatment
 Not on filedocumented as of this encounter
 
 Procedures
 
 
+---------------------+--------+-------------+----------------------+----------------------+
| Procedure Name      | Priori | Date/Time   | Associated Diagnosis | Comments             |
|                     | ty     |             |                      |                      |
+---------------------+--------+-------------+----------------------+----------------------+
| CLYDE DIGITAL         | Routin | 03/15/2007  |                      |   Results for this   |
| SCREENING BILATERAL | e      |  4:05 PM    |                      | procedure are in the |
|                     |        | PDT         |                      |  results section.    |
+---------------------+--------+-------------+----------------------+----------------------+
 documented in this encounter
 
 Results
 CLYDE Digital Screening Bilateral (03/15/2007  4:05 PM PDT)
 
+----------+
| Specimen |
+----------+
|          |
+----------+
 
 
 
+------------------------------------------------------------------------+---------------+
| Narrative                                                              | Performed At  |
+------------------------------------------------------------------------+---------------+
|   This result has an attachment that is not available.  External films |   [facility] |
|  for comparison only - no result from [place].                      |               |
+------------------------------------------------------------------------+---------------+
 
 
 
+---------------+---------+--------------------+--------------+
| Performing    | Address | City/State/Zipcode | Phone Number |
| Organization  |         |                    |              |
+---------------+---------+--------------------+--------------+
|   [facility] |         |                    |              |
+---------------+---------+--------------------+--------------+
 documented in this encounter
 
 Visit Diagnoses
 Not on filedocumented in this encounter

## 2020-05-13 NOTE — XMS
Encounter Summary
  Created on: 2020
 
 Sarah Escamilla
 External Reference #: [id number]
 : 60
 Sex: Female
 
 Demographics
 
 
+-----------------------+----------------------+
| Address               | [address] |
|                       | [place]  [address] |
+-----------------------+----------------------+
| Home Phone            | [phone]      |
+-----------------------+----------------------+
| Preferred Language    | Unknown              |
+-----------------------+----------------------+
| Marital Status        |               |
+-----------------------+----------------------+
| Holiness Affiliation | Unknown              |
+-----------------------+----------------------+
| Race                  | Unknown              |
+-----------------------+----------------------+
| Ethnic Group          | Unknown              |
+-----------------------+----------------------+
 
 
 Author
 
 
+--------------+--------------------------------------------+
| Author       | [facility]  |
|              | and [place]                                |
+--------------+--------------------------------------------+
| Organization | [facility]  |
|              | and [place]                                |
+--------------+--------------------------------------------+
| Address      | Unknown                                    |
+--------------+--------------------------------------------+
| Phone        | Unavailable                                |
+--------------+--------------------------------------------+
 
 
 
 Support
 
 
+------------------+--------------+---------+-----------------+
| Name             | Relationship | Address | Phone           |
+------------------+--------------+---------+-----------------+
| Alvino Escamilla | ECON         | Unknown | [phone] |
+------------------+--------------+---------+-----------------+
 
 
 
 Care Team Providers
 
 
 
+-----------------------+------+-------------+
| Care Team Member Name | Role | Phone       |
+-----------------------+------+-------------+
 PCP  | Unavailable |
+-----------------------+------+-------------+
 
 
 
 Encounter Details
 
 
+--------+----------+----------------------+----------------------+-------------+
| Date   | Type     | Department           | Care Team            | Description |
+--------+----------+----------------------+----------------------+-------------+
| / | Imaging  |   [facility] |   Provider,          |             |
|    | Exam     |  MED CTR EXTERNAL    | [facility]  [address] |             |
|        |          | IMAGING  [address]       |  [address]        |             |
|        |          | [address]  [place]     | [place] [id number]     |             |
|        |          | [place] [address] |                      |             |
|        |          |   [phone]       |                      |             |
+--------+----------+----------------------+----------------------+-------------+
 
 
 
 Social History
 
 
+----------------+-------+-----------+--------+------+
| Tobacco Use    | Types | Packs/Day | Years  | Date |
|                |       |           | Used   |      |
+----------------+-------+-----------+--------+------+
| Never Assessed |       |           |        |      |
+----------------+-------+-----------+--------+------+
 
 
 
+------------------+---------------+
| Sex Assigned at  | Date Recorded |
| Birth            |               |
+------------------+---------------+
| Not on file      |               |
+------------------+---------------+
 
 
 
+----------------+-------------+-------------+
| Job Start Date | Occupation  | Industry    |
+----------------+-------------+-------------+
| Not on file    | Not on file | Not on file |
+----------------+-------------+-------------+
 
 
 
+----------------+--------------+------------+
| Travel History | Travel Start | Travel End |
+----------------+--------------+------------+
 
 
 
 
+-------------------------------------+
| No recent travel history available. |
+-------------------------------------+
 documented as of this encounter
 
 Plan of Treatment
 Not on filedocumented as of this encounter
 
 Procedures
 
 
+---------------------+--------+-------------+----------------------+----------------------+
| Procedure Name      | Priori | Date/Time   | Associated Diagnosis | Comments             |
|                     | ty     |             |                      |                      |
+---------------------+--------+-------------+----------------------+----------------------+
| CLYDE DIGITAL         | Routin | 03/15/2007  |                      |   Results for this   |
| SCREENING BILATERAL | e      |  4:05 PM    |                      | procedure are in the |
|                     |        | PDT         |                      |  results section.    |
+---------------------+--------+-------------+----------------------+----------------------+
 documented in this encounter
 
 Results
 CLYDE Digital Screening Bilateral (03/15/2007  4:05 PM PDT)
 
+----------+
| Specimen |
+----------+
|          |
+----------+
 
 
 
+------------------------------------------------------------------------+---------------+
| Narrative                                                              | Performed At  |
+------------------------------------------------------------------------+---------------+
|   This result has an attachment that is not available.  External films |   [facility] |
|  for comparison only - no result from [place].                      |               |
+------------------------------------------------------------------------+---------------+
 
 
 
+---------------+---------+--------------------+--------------+
| Performing    | Address | City/State/Zipcode | Phone Number |
| Organization  |         |                    |              |
+---------------+---------+--------------------+--------------+
|   [facility] |         |                    |              |
+---------------+---------+--------------------+--------------+
 documented in this encounter
 
 Visit Diagnoses
 Not on filedocumented in this encounter

## 2020-05-13 NOTE — XMS
Encounter Summary
  Created on: 2020
 
 Sarah Escamilla
 External Reference #: [id number]
 : 60
 Sex: Female
 
 Demographics
 
 
+-----------------------+----------------------+
| Address               | [address] |
|                       | [place]  [address] |
+-----------------------+----------------------+
| Home Phone            | [phone]      |
+-----------------------+----------------------+
| Preferred Language    | Unknown              |
+-----------------------+----------------------+
| Marital Status        |               |
+-----------------------+----------------------+
| Anglican Affiliation | Unknown              |
+-----------------------+----------------------+
| Race                  | Unknown              |
+-----------------------+----------------------+
| Ethnic Group          | Unknown              |
+-----------------------+----------------------+
 
 
 Author
 
 
+--------------+--------------------------------------------+
| Author       | [facility] [place]  |
|              | and [place]                                |
+--------------+--------------------------------------------+
| Organization | [facility]  |
|              | and [place]                                |
+--------------+--------------------------------------------+
| Address      | Unknown                                    |
+--------------+--------------------------------------------+
| Phone        | Unavailable                                |
+--------------+--------------------------------------------+
 
 
 
 Support
 
 
+------------------+--------------+---------+-----------------+
| Name             | Relationship | Address | Phone           |
+------------------+--------------+---------+-----------------+
| Alvino Escamilla | ECON         | Unknown | [phone] |
+------------------+--------------+---------+-----------------+
 
 
 
 Care Team Providers
 
 
 
+-----------------------+------+-----------------+
| Care Team Member Name | Role | Phone           |
+-----------------------+------+-----------------+
| Unknown, Doctor       | PCP  | [phone] |
+-----------------------+------+-----------------+
 
 
 
 Encounter Details
 
 
+--------+----------+----------------------+----------------------+-------------+
| Date   | Type     | Department           | Care Team            | Description |
+--------+----------+----------------------+----------------------+-------------+
| / | Imaging  |   [facility] |   Provider,          |             |
| 2017   | Exam     |  MED CTR EXTERNAL    | [facility]  [address] |             |
|        |          | [address]AG[address]  [address]       |  [address]        |             |
|        |          | [address] [place]     | [place] [address]     |             |
|        |          | [place] [address] |                      |             |
|        |          |   [phone]       |                      |             |
+--------+----------+----------------------+----------------------+-------------+
 
 
 
 Social History
 
 
+----------------+-------+-----------+--------+------+
| Tobacco Use    | Types | Packs/Day | Years  | Date |
|                |       |           | Used   |      |
+----------------+-------+-----------+--------+------+
| Never Assessed |       |           |        |      |
+----------------+-------+-----------+--------+------+
 
 
 
+------------------+---------------+
| Sex Assigned at  | Date Recorded |
| Birth            |               |
+------------------+---------------+
| Not on file      |               |
+------------------+---------------+
 
 
 
+----------------+-------------+-------------+
| Job Start Date | Occupation  | Industry    |
+----------------+-------------+-------------+
| Not on file    | Not on file | Not on file |
+----------------+-------------+-------------+
 
 
 
+----------------+--------------+------------+
| Travel History | Travel Start | Travel End |
+----------------+--------------+------------+
 
 
 
 
+-------------------------------------+
| No recent travel history available. |
+-------------------------------------+
 documented as of this encounter
 
 Plan of Treatment
 Not on filedocumented as of this encounter
 
 Procedures
 
 
+---------------------+--------+-------------+----------------------+----------------------+
| Procedure Name      | Priori | Date/Time   | Associated Diagnosis | Comments             |
|                     | ty     |             |                      |                      |
+---------------------+--------+-------------+----------------------+----------------------+
| CLYDE DIGITAL         | Routin | 10/25/2016  |                      |   Results for this   |
| SCREENING BILATERAL | e      | 12:50 PM    |                      | procedure are in the |
|                     |        | PDT         |                      |  results section.    |
+---------------------+--------+-------------+----------------------+----------------------+
 documented in this encounter
 
 Results
 CLYDE Digital Screening Bilateral (10/25/2016 12:50 PM PDT)
 
+----------+
| Specimen |
+----------+
|          |
+----------+
 
 
 
+------------------------------------------------------------------------+---------------+
| Narrative                                                              | Performed At  |
+------------------------------------------------------------------------+---------------+
|   This result has an attachment that is not available.  External films |   [facility] |
|  for comparison only - no result from [place].                      |               |
+------------------------------------------------------------------------+---------------+
 
 
 
+---------------+---------+--------------------+--------------+
| Performing    | Address | City/State/Zipcode | Phone Number |
| Organization  |         |                    |              |
+---------------+---------+--------------------+--------------+
|   [facility] |         |                    |              |
+---------------+---------+--------------------+--------------+
 documented in this encounter
 
 Visit Diagnoses
 Not on filedocumented in this encounter

## 2020-05-13 NOTE — XMS
Encounter Summary
  Created on: 2020
 
 Sarah Escamilla
 External Reference #: [id number]
 : 60
 Sex: Female
 
 Demographics
 
 
+-----------------------+----------------------+
| Address               | [address] |
|                       | [place]  [address] |
+-----------------------+----------------------+
| Home Phone            | [phone]      |
+-----------------------+----------------------+
| Preferred Language    | Unknown              |
+-----------------------+----------------------+
| Marital Status        |               |
+-----------------------+----------------------+
| Mormon Affiliation | Unknown              |
+-----------------------+----------------------+
| Race                  | Unknown              |
+-----------------------+----------------------+
| Ethnic Group          | Unknown              |
+-----------------------+----------------------+
 
 
 Author
 
 
+--------------+--------------------------------------------+
| Author       | [facility]  |
|              | and [facility]                                |
+--------------+--------------------------------------------+
| Organization | [facility]  |
|              | and [place]                                |
+--------------+--------------------------------------------+
| Address      | Unknown                                    |
+--------------+--------------------------------------------+
| Phone        | Unavailable                                |
+--------------+--------------------------------------------+
 
 
 
 Support
 
 
+------------------+--------------+---------+-----------------+
| Name             | Relationship | Address | Phone           |
+------------------+--------------+---------+-----------------+
| Alvino Escamilla | ECON         | Unknown | [phone] |
+------------------+--------------+---------+-----------------+
 
 
 
 Care Team Providers
 
 
 
+-----------------------+------+-----------------+
| Care Team Member Name | Role | Phone           |
+-----------------------+------+-----------------+
| Unknown, Doctor       | PCP  | [phone] |
+-----------------------+------+-----------------+
 
 
 
 Encounter Details
 
 
+--------+----------+----------------------+----------------------+-------------+
| Date   | Type     | Department           | Care Team            | Description |
+--------+----------+----------------------+----------------------+-------------+
| / | Imaging  |   [facility] |   Provider,          |             |
| 2017   | Exam     |  [facility]    | [id number] |             |
|        |          | IMAGING  [address]       |  [address]        |             |
|        |          | [address] [place]     | [place] [address]     |             |
|        |          | [place] [id number] |                      |             |
|        |          |   [phone]       |                      |             |
+--------+----------+----------------------+----------------------+-------------+
 
 
 
 Social History
 
 
+----------------+-------+-----------+--------+------+
| Tobacco Use    | Types | Packs/Day | Years  | Date |
|                |       |           | Used   |      |
+----------------+-------+-----------+--------+------+
| Never Assessed |       |           |        |      |
+----------------+-------+-----------+--------+------+
 
 
 
+------------------+---------------+
| Sex Assigned at  | Date Recorded |
| Birth            |               |
+------------------+---------------+
| Not on file      |               |
+------------------+---------------+
 
 
 
+----------------+-------------+-------------+
| Job Start Date | Occupation  | Industry    |
+----------------+-------------+-------------+
| Not on file    | Not on file | Not on file |
+----------------+-------------+-------------+
 
 
 
+----------------+--------------+------------+
| Travel History | Travel Start | Travel End |
+----------------+--------------+------------+
 
 
 
 
+-------------------------------------+
| No recent travel history available. |
+-------------------------------------+
 documented as of this encounter
 
 Plan of Treatment
 Not on filedocumented as of this encounter
 
 Procedures
 
 
+---------------------+--------+-------------+----------------------+----------------------+
| Procedure Name      | Priori | Date/Time   | Associated Diagnosis | Comments             |
|                     | ty     |             |                      |                      |
+---------------------+--------+-------------+----------------------+----------------------+
| CLYDE DIGITAL         | Routin | 10/21/2015  |                      |   Results for this   |
| SCREENING BILATERAL | e      |  3:20 PM    |                      | procedure are in the |
|                     |        | PDT         |                      |  results section.    |
+---------------------+--------+-------------+----------------------+----------------------+
 documented in this encounter
 
 Results
 CLYDE Digital Screening Bilateral (10/21/2015  3:20 PM PDT)
 
+----------+
| Specimen |
+----------+
|          |
+----------+
 
 
 
+------------------------------------------------------------------------+---------------+
| Narrative                                                              | Performed At  |
+------------------------------------------------------------------------+---------------+
|   This result has an attachment that is not available.  External films |   [facility] |
|  for comparison only - no result from [place].                      |               |
+------------------------------------------------------------------------+---------------+
 
 
 
+---------------+---------+--------------------+--------------+
| Performing    | Address | City/State/Zipcode | Phone Number |
| Organization  |         |                    |              |
+---------------+---------+--------------------+--------------+
|   [facility] |         |                    |              |
+---------------+---------+--------------------+--------------+
 documented in this encounter
 
 Visit Diagnoses
 Not on filedocumented in this encounter

## 2020-05-13 NOTE — XMS
Clinical Summary
  Created on: 2020
 
 Sarah Escamilla
 External Reference #: [id number]
 : 60
 Sex: Female
 
 Demographics
 
 
+-----------------------+----------------------+
| Address               | [address] |
|                       | [place]  [address] |
+-----------------------+----------------------+
| Home Phone            | [phone]      |
+-----------------------+----------------------+
| Preferred Language    | Unknown              |
+-----------------------+----------------------+
| Marital Status        |               |
+-----------------------+----------------------+
| [employer] Affiliation | Unknown              |
+-----------------------+----------------------+
| Race                  | Unknown              |
+-----------------------+----------------------+
| Ethnic Group          | Unknown              |
+-----------------------+----------------------+
 
 
 Author
 
 
+--------------+--------------------------------------------+
| Author       | [facility] [place]  |
|              | and [place]                                |
+--------------+--------------------------------------------+
| Organization | [facility] [place]  |
|              | and [place]                                |
+--------------+--------------------------------------------+
| Address      | Unknown                                    |
+--------------+--------------------------------------------+
| Phone        | Unavailable                                |
+--------------+--------------------------------------------+
 
 
 
 Support
 
 
+------------------+--------------+---------+-----------------+
| Name             | Relationship | Address | Phone           |
+------------------+--------------+---------+-----------------+
| Alvino Escamilla | ECON         | Unknown | [phone] |
+------------------+--------------+---------+-----------------+
 
 
 
 Care Team Providers
 
 
 
+-----------------------+------+-----------------+
| Care Team Member Name | Role | Phone           |
+-----------------------+------+-----------------+
| Unknown, Doctor       | PCP  | [phone] |
+-----------------------+------+-----------------+
 
 
 
 Allergies
 Not on File
 
 Medications
 Not on file
 
 Active Problems
 Not on file
 
 Social History
 
 
+----------------+-------+-----------+--------+------+
| Tobacco Use    | Types | Packs/Day | Years  | Date |
|                |       |           | Used   |      |
+----------------+-------+-----------+--------+------+
| Never Assessed |       |           |        |      |
+----------------+-------+-----------+--------+------+
 
 
 
+------------------+---------------+
| Sex Assigned at  | Date Recorded |
| Birth            |               |
+------------------+---------------+
| Not on file      |               |
+------------------+---------------+
 
 
 
+----------------+-------------+-------------+
| Job Start Date | Occupation  | Industry    |
+----------------+-------------+-------------+
| Not on file    | Not on file | Not on file |
+----------------+-------------+-------------+
 
 
 
+----------------+--------------+------------+
| Travel History | Travel Start | Travel End |
+----------------+--------------+------------+
 
 
 
+-------------------------------------+
| No recent travel history available. |
+-------------------------------------+
 
 
 
 
 Last Filed Vital Signs
 Not on file
 
 Plan of Treatment
 
 
+---------------------+-----------+---------------------------------+----------+
| Health Maintenance  | Due Date  | Last Done                       | Comments |
+---------------------+-----------+---------------------------------+----------+
| Vaccine:            |  |                                 |          |
| Dtap/Tdap/Td (1 -   | 1         |                                 |          |
| Tdap)               |           |                                 |          |
+---------------------+-----------+---------------------------------+----------+
| Cervical Cancer     |  |                                 |          |
| Screening (Pap)     | 0         |                                 |          |
+---------------------+-----------+---------------------------------+----------+
| Vaccine: Zoster (1  |  |                                 |          |
| of 2)               | 0         |                                 |          |
+---------------------+-----------+---------------------------------+----------+
| Breast Cancer       | 10/26/201 | 10/26/2017, 10/25/2016,         |          |
| Screening           | 9         | 10/21/2015, Additional history  |          |
|                     |           | exists                          |          |
+---------------------+-----------+---------------------------------+----------+
| Vaccine: Influenza  |  |                                 |          |
| (Season Ended)      | 0         |                                 |          |
+---------------------+-----------+---------------------------------+----------+
 
 
 
 Results
 Not on filefrom Last 3 Months
 
 Insurance
 
 
+-----------+--------+-------------+--------+-------------+---------+------+
| Payer     | Benefi | Subscriber  | Effect | Phone       | Address | Type |
|           | t Plan | ID          | elizabeth    |             |         |      |
|           |  /     |             | Dates  |             |         |      |
|           | Group  |             |        |             |         |      |
+-----------+--------+-------------+--------+-------------+---------+------+
| [employer] | HEALTH | [id number]   | 20 | [phone] |         | PPO  |
|           | NET    |             | 14-Pre | 1           |         |      |
|           | PPO    |             | sent   |             |         |      |
+-----------+--------+-------------+--------+-------------+---------+------+
 
 
 
+-------------------+--------+-------------+--------+-------------+---------------------+
| Guarantor Name    | Accoun | Relation to | Date   | Phone       | Billing Address     |
|                   | t Type |  Patient    | of     |             |                     |
|                   |        |             | Birth  |             |                     |
+-------------------+--------+-------------+--------+-------------+---------------------+
| Sarah Escamilla | Person | Self        | / |             |   [address]  |
|                   | [address]/Fam |             | 1960   | [phone] | [place]  |
|                   | [place]    |             |        | [address] (Home)    | [id number]               |
+-------------------+--------+-------------+--------+-------------+---------------------+
 
 
 
 
 Advance Directives
 
 
+-----------+-----------------+----------------+-------------+
| Type      | Date Recorded   | Patient        | Explanation |
|           |                 | Representative |             |
+-----------+-----------------+----------------+-------------+
| Power of  |                 |                |             |
|   |                 |                |             |
+-----------+-----------------+----------------+-------------+
| Advance   | 1/10/2018  8:28 |                |             |
| Directive |  AM             |                |             |
+-----------+-----------------+----------------+-------------+

## 2020-05-13 NOTE — XMS
Encounter Summary
  Created on: 2020
 
 Sarah Escamilla
 External Reference #: [id number]
 : 60
 Sex: Female
 
 Demographics
 
 
+-----------------------+----------------------+
| Address               | [address] |
|                       | [place]  [address] |
+-----------------------+----------------------+
| Home Phone            | [phone]      |
+-----------------------+----------------------+
| Preferred Language    | Unknown              |
+-----------------------+----------------------+
| Marital Status        |               |
+-----------------------+----------------------+
| [employer] Affiliation | Unknown              |
+-----------------------+----------------------+
| Race                  | Unknown              |
+-----------------------+----------------------+
| Ethnic Group          | Unknown              |
+-----------------------+----------------------+
 
 
 Author
 
 
+--------------+--------------------------------------------+
| Author       | [facility] [place]  |
|              | and [place]                                |
+--------------+--------------------------------------------+
| Organization | [facility] [place]  |
|              | and [place]                                |
+--------------+--------------------------------------------+
| Address      | Unknown                                    |
+--------------+--------------------------------------------+
| Phone        | Unavailable                                |
+--------------+--------------------------------------------+
 
 
 
 Support
 
 
+------------------+--------------+---------+-----------------+
| Name             | Relationship | Address | Phone           |
+------------------+--------------+---------+-----------------+
| Alvino Escamilla | ECON         | Unknown | [phone] |
+------------------+--------------+---------+-----------------+
 
 
 
 Care Team Providers
 
 
 
+-----------------------+------+-----------------+
| Care Team Member Name | Role | Phone           |
+-----------------------+------+-----------------+
| Unknown, Doctor       | PCP  | [phone] |
+-----------------------+------+-----------------+
 
 
 
 Encounter Details
 
 
+--------+----------+----------------------+----------------------+-------------+
| Date   | Type     | Department           | Care Team            | Description |
+--------+----------+----------------------+----------------------+-------------+
| / | Imaging  |   [facility] |   Provider,          |             |
| 2017   | Exam     |  [facility]    | [address]  [address] |             |
|        |          | IMAGING  [address]       |  [address]        |             |
|        |          | [address] [place]     | [place] [address]     |             |
|        |          | [place] [address] |                      |             |
|        |          |   [phone]       |                      |             |
+--------+----------+----------------------+----------------------+-------------+
 
 
 
 Social History
 
 
+----------------+-------+-----------+--------+------+
| Tobacco Use    | Types | Packs/Day | Years  | Date |
|                |       |           | Used   |      |
+----------------+-------+-----------+--------+------+
| Never Assessed |       |           |        |      |
+----------------+-------+-----------+--------+------+
 
 
 
+------------------+---------------+
| Sex Assigned at  | Date Recorded |
| Birth            |               |
+------------------+---------------+
| Not on file      |               |
+------------------+---------------+
 
 
 
+----------------+-------------+-------------+
| Job Start Date | Occupation  | Industry    |
+----------------+-------------+-------------+
| Not on file    | Not on file | Not on file |
+----------------+-------------+-------------+
 
 
 
+----------------+--------------+------------+
| Travel History | Travel Start | Travel End |
+----------------+--------------+------------+
 
 
 
 
+-------------------------------------+
| No recent travel history available. |
+-------------------------------------+
 documented as of this encounter
 
 Plan of Treatment
 Not on filedocumented as of this encounter
 
 Procedures
 
 
+-----------------+--------+-------------+----------------------+----------------------+
| Procedure Name  | Priori | Date/Time   | Associated Diagnosis | Comments             |
|                 | ty     |             |                      |                      |
+-----------------+--------+-------------+----------------------+----------------------+
| CLYDE DIGITAL     | Routin | 2015  |                      |   Results for this   |
| DIAGNOSTIC LEFT | e      |  2:45 PM    |                      | procedure are in the |
|                 |        | PST         |                      |  results section.    |
+-----------------+--------+-------------+----------------------+----------------------+
 documented in this encounter
 
 Results
 CLYDE Digital Diagnostic Left (2015  2:45 PM PST)
 
+----------+
| Specimen |
+----------+
|          |
+----------+
 
 
 
+------------------------------------------------------------------------+---------------+
| Narrative                                                              | Performed At  |
+------------------------------------------------------------------------+---------------+
|   This result has an attachment that is not available.  External films |   [facility] |
|  for comparison only - no result from [place].                      |               |
+------------------------------------------------------------------------+---------------+
 
 
 
+---------------+---------+--------------------+--------------+
| Performing    | Address | City/State/Zipcode | Phone Number |
| Organization  |         |                    |              |
+---------------+---------+--------------------+--------------+
|   [facility] |         |                    |              |
+---------------+---------+--------------------+--------------+
 documented in this encounter
 
 Visit Diagnoses
 Not on filedocumented in this encounter

## 2020-05-13 NOTE — XMS
Encounter Summary
  Created on: 2020
 
 Sarah Escamilla
 External Reference #: [id number]
 : 60
 Sex: Female
 
 Demographics
 
 
+-----------------------+----------------------+
| Address               | [address] |
|                       | [place]  [address] |
+-----------------------+----------------------+
| Home Phone            | [phone]      |
+-----------------------+----------------------+
| Preferred Language    | Unknown              |
+-----------------------+----------------------+
| Marital Status        |               |
+-----------------------+----------------------+
| Mandaen Affiliation | Unknown              |
+-----------------------+----------------------+
| Race                  | Unknown              |
+-----------------------+----------------------+
| Ethnic Group          | Unknown              |
+-----------------------+----------------------+
 
 
 Author
 
 
+--------------+--------------------------------------------+
| Author       | [facility] [place]  |
|              | and [place]                                |
+--------------+--------------------------------------------+
| Organization | [facility] [place]  |
|              | and [place]                                |
+--------------+--------------------------------------------+
| Address      | Unknown                                    |
+--------------+--------------------------------------------+
| Phone        | Unavailable                                |
+--------------+--------------------------------------------+
 
 
 
 Support
 
 
+------------------+--------------+---------+-----------------+
| Name             | Relationship | Address | Phone           |
+------------------+--------------+---------+-----------------+
| Alvino Escamilla | ECON         | Unknown | [phone] |
+------------------+--------------+---------+-----------------+
 
 
 
 Care Team Providers
 
 
 
+-----------------------+------+-----------------+
| Care Team Member Name | Role | Phone           |
+-----------------------+------+-----------------+
| Unknown, Doctor       | PCP  | [phone] |
+-----------------------+------+-----------------+
 
 
 
 Encounter Details
 
 
+--------+----------+----------------------+----------------------+-------------+
| Date   | Type     | Department           | Care Team            | Description |
+--------+----------+----------------------+----------------------+-------------+
| / | Imaging  |   [facility] |   Provider,          |             |
| 2017   | Exam     |  MED [facility] EXTERNAL    | [id number] |             |
|        |          | IMAGING  [address]       |  [address]        |             |
|        |          | [address] [place]     | [place] [id number]     |             |
|        |          | [place] [address] |                      |             |
|        |          |   [phone]       |                      |             |
+--------+----------+----------------------+----------------------+-------------+
 
 
 
 Social History
 
 
+----------------+-------+-----------+--------+------+
| Tobacco Use    | Types | Packs/Day | Years  | Date |
|                |       |           | Used   |      |
+----------------+-------+-----------+--------+------+
| Never Assessed |       |           |        |      |
+----------------+-------+-----------+--------+------+
 
 
 
+------------------+---------------+
| Sex Assigned at  | Date Recorded |
| Birth            |               |
+------------------+---------------+
| Not on file      |               |
+------------------+---------------+
 
 
 
+----------------+-------------+-------------+
| Job Start Date | Occupation  | Industry    |
+----------------+-------------+-------------+
| Not on file    | Not on file | Not on file |
+----------------+-------------+-------------+
 
 
 
+----------------+--------------+------------+
| Travel History | Travel Start | Travel End |
+----------------+--------------+------------+
 
 
 
 
+-------------------------------------+
| No recent travel history available. |
+-------------------------------------+
 documented as of this encounter
 
 Plan of Treatment
 Not on filedocumented as of this encounter
 
 Procedures
 
 
+------------------+--------+-------------+----------------------+----------------------+
| Procedure Name   | Priori | Date/Time   | Associated Diagnosis | Comments             |
|                  | ty     |             |                      |                      |
+------------------+--------+-------------+----------------------+----------------------+
| CLYDE DIGITAL      | Routin | 2016  |                      |   Results for this   |
| DIAGNOSTIC RIGHT | e      | 10:20 AM    |                      | procedure are in the |
|                  |        | PDT         |                      |  results section.    |
+------------------+--------+-------------+----------------------+----------------------+
 documented in this encounter
 
 Results
 CLYDE Digital Diagnostic Right (2016 10:20 AM PDT)
 
+----------+
| Specimen |
+----------+
|          |
+----------+
 
 
 
+------------------------------------------------------------------------+---------------+
| Narrative                                                              | Performed At  |
+------------------------------------------------------------------------+---------------+
|   This result has an attachment that is not available.  External films |   [facility] |
|  for comparison only - no result from [place].                      |               |
+------------------------------------------------------------------------+---------------+
 
 
 
+---------------+---------+--------------------+--------------+
| Performing    | Address | City/State/Zipcode | Phone Number |
| Organization  |         |                    |              |
+---------------+---------+--------------------+--------------+
|   [facility] |         |                    |              |
+---------------+---------+--------------------+--------------+
 documented in this encounter
 
 Visit Diagnoses
 Not on filedocumented in this encounter

## 2020-05-13 NOTE — XMS
Encounter Summary
  Created on: 2020
 
 Sarah Escamilla
 External Reference #: [id number]
 : 60
 Sex: Female
 
 Demographics
 
 
+-----------------------+----------------------+
| Address               | [address] |
|                       | [place]  [address] |
+-----------------------+----------------------+
| Home Phone            | [phone]      |
+-----------------------+----------------------+
| Preferred Language    | Unknown              |
+-----------------------+----------------------+
| Marital Status        |               |
+-----------------------+----------------------+
| Moravian Affiliation | Unknown              |
+-----------------------+----------------------+
| Race                  | Unknown              |
+-----------------------+----------------------+
| Ethnic Group          | Unknown              |
+-----------------------+----------------------+
 
 
 Author
 
 
+--------------+--------------------------------------------+
| Author       | [facility] [place]  |
|              | and [place]                                |
+--------------+--------------------------------------------+
| Organization | [facility] [place]  |
|              | and [place]                                |
+--------------+--------------------------------------------+
| Address      | Unknown                                    |
+--------------+--------------------------------------------+
| Phone        | Unavailable                                |
+--------------+--------------------------------------------+
 
 
 
 Support
 
 
+------------------+--------------+---------+-----------------+
| Name             | Relationship | Address | Phone           |
+------------------+--------------+---------+-----------------+
| Alvino Escamilla | ECON         | Unknown | [phone] |
+------------------+--------------+---------+-----------------+
 
 
 
 Care Team Providers
 
 
 
+-----------------------+------+-------------+
| Care Team Member Name | Role | Phone       |
+-----------------------+------+-------------+
 PCP  | Unavailable |
+-----------------------+------+-------------+
 
 
 
 Encounter Details
 
 
+--------+-----------+----------------------+-----------+-------------+
| Date   | Type      | Department           | Care Team | Description |
+--------+-----------+----------------------+-----------+-------------+
| / | Hospital  |   [facility] |           |             |
|    | Encounter |  MED CTR MP INTRA OP |           |             |
|        |           |   [address]       |           |             |
|        |           | [place]      |           |             |
|        |           | [address]           |           |             |
|        |           | [phone]         |           |             |
+--------+-----------+----------------------+-----------+-------------+
 
 
 
 Social History
 
 
+----------------+-------+-----------+--------+------+
| Tobacco Use    | Types | Packs/Day | Years  | Date |
|                |       |           | Used   |      |
+----------------+-------+-----------+--------+------+
| Never Assessed |       |           |        |      |
+----------------+-------+-----------+--------+------+
 
 
 
+------------------+---------------+
| Sex Assigned at  | Date Recorded |
| Birth            |               |
+------------------+---------------+
| Not on file      |               |
+------------------+---------------+
 
 
 
+----------------+-------------+-------------+
| Job Start Date | Occupation  | Industry    |
+----------------+-------------+-------------+
| Not on file    | Not on file | Not on file |
+----------------+-------------+-------------+
 
 
 
+----------------+--------------+------------+
| Travel History | Travel Start | Travel End |
+----------------+--------------+------------+
 
 
 
 
+-------------------------------------+
| No recent travel history available. |
+-------------------------------------+
 documented as of this encounter
 
 Plan of Treatment
 Not on filedocumented as of this encounter
 
 Visit Diagnoses
 Not on filedocumented in this encounter

## 2020-05-13 NOTE — XMS
Encounter Summary
  Created on: 2020
 
 Sarah Escamilla
 External Reference #: [id number]
 : 60
 Sex: Female
 
 Demographics
 
 
+-----------------------+----------------------+
| Address               | [address] |
|                       | [place]  [address] |
+-----------------------+----------------------+
| Home Phone            | [phone]      |
+-----------------------+----------------------+
| Preferred Language    | Unknown              |
+-----------------------+----------------------+
| Marital Status        |               |
+-----------------------+----------------------+
| Sikhism Affiliation | Unknown              |
+-----------------------+----------------------+
| Race                  | Unknown              |
+-----------------------+----------------------+
| Ethnic Group          | Unknown              |
+-----------------------+----------------------+
 
 
 Author
 
 
+--------------+--------------------------------------------+
| Author       | [facility] [place]  |
|              | and [place]                                |
+--------------+--------------------------------------------+
| Organization | [facility] [place]  |
|              | and [place]                                |
+--------------+--------------------------------------------+
| Address      | Unknown                                    |
+--------------+--------------------------------------------+
| Phone        | Unavailable                                |
+--------------+--------------------------------------------+
 
 
 
 Support
 
 
+------------------+--------------+---------+-----------------+
| Name             | Relationship | Address | Phone           |
+------------------+--------------+---------+-----------------+
| Alvino Escamilla | ECON         | Unknown | [phone] |
+------------------+--------------+---------+-----------------+
 
 
 
 Care Team Providers
 
 
 
+-----------------------+------+-----------------+
| Care Team Member Name | Role | Phone           |
+-----------------------+------+-----------------+
| Unknown, Doctor       | PCP  | [phone] |
+-----------------------+------+-----------------+
 
 
 
 Encounter Details
 
 
+--------+----------+----------------------+----------------------+-------------+
| Date   | Type     | Department           | Care Team            | Description |
+--------+----------+----------------------+----------------------+-------------+
| / | Imaging  |   [facility] |   Provider,          |             |
| 2017   | Exam     |  MED CTR EXTERNAL    | [address] |             |
|        |          | IMAGING  [address]       |  [address]        |             |
|        |          | [address] [place]     | [place] [address]     |             |
|        |          | [place] [address] |                      |             |
|        |          |   [phone]       |                      |             |
+--------+----------+----------------------+----------------------+-------------+
 
 
 
 Social History
 
 
+----------------+-------+-----------+--------+------+
| Tobacco Use    | Types | Packs/Day | Years  | Date |
|                |       |           | Used   |      |
+----------------+-------+-----------+--------+------+
| Never Assessed |       |           |        |      |
+----------------+-------+-----------+--------+------+
 
 
 
+------------------+---------------+
| Sex Assigned at  | Date Recorded |
| Birth            |               |
+------------------+---------------+
| Not on file      |               |
+------------------+---------------+
 
 
 
+----------------+-------------+-------------+
| Job Start Date | Occupation  | Industry    |
+----------------+-------------+-------------+
| Not on file    | Not on file | Not on file |
+----------------+-------------+-------------+
 
 
 
+----------------+--------------+------------+
| Travel History | Travel Start | Travel End |
+----------------+--------------+------------+
 
 
 
 
+-------------------------------------+
| No recent travel history available. |
+-------------------------------------+
 documented as of this encounter
 
 Plan of Treatment
 Not on filedocumented as of this encounter
 
 Procedures
 
 
+-----------------+--------+-------------+----------------------+----------------------+
| Procedure Name  | Priori | Date/Time   | Associated Diagnosis | Comments             |
|                 | ty     |             |                      |                      |
+-----------------+--------+-------------+----------------------+----------------------+
| CLYDE DIGITAL     | Routin | 2017  |                      |   Results for this   |
| DIAGNOSTIC LEFT | e      | 11:05 AM    |                      | procedure are in the |
|                 |        | PST         |                      |  results section.    |
+-----------------+--------+-------------+----------------------+----------------------+
 documented in this encounter
 
 Results
 CLYDE Digital Diagnostic Left (2017 11:05 AM PST)
 
+----------+
| Specimen |
+----------+
|          |
+----------+
 
 
 
+------------------------------------------------------------------------+---------------+
| Narrative                                                              | Performed At  |
+------------------------------------------------------------------------+---------------+
|   This result has an attachment that is not available.  External films |   [facility] |
|  for comparison only - no result from [place].                      |               |
+------------------------------------------------------------------------+---------------+
 
 
 
+---------------+---------+--------------------+--------------+
| Performing    | Address | City/State/Zipcode | Phone Number |
| Organization  |         |                    |              |
+---------------+---------+--------------------+--------------+
|   [facility] |         |                    |              |
+---------------+---------+--------------------+--------------+
 documented in this encounter
 
 Visit Diagnoses
 Not on filedocumented in this encounter

## 2020-05-13 NOTE — XMS
Encounter Summary
  Created on: 2020
 
 Sarah Escamilla
 External Reference #: [id number]
 : 60
 Sex: Female
 
 Demographics
 
 
+-----------------------+----------------------+
| Address               | [address] |
|                       | [place]  [address] |
+-----------------------+----------------------+
| Home Phone            | [phone]      |
+-----------------------+----------------------+
| Preferred Language    | Unknown              |
+-----------------------+----------------------+
| Marital Status        |               |
+-----------------------+----------------------+
| Taoist Affiliation | Unknown              |
+-----------------------+----------------------+
| Race                  | Unknown              |
+-----------------------+----------------------+
| Ethnic Group          | Unknown              |
+-----------------------+----------------------+
 
 
 Author
 
 
+--------------+--------------------------------------------+
| Author       | [facility] [place]  |
|              | and [place]                                |
+--------------+--------------------------------------------+
| Organization | [facility] [place]  |
|              | and [place]                                |
+--------------+--------------------------------------------+
| Address      | Unknown                                    |
+--------------+--------------------------------------------+
| Phone        | Unavailable                                |
+--------------+--------------------------------------------+
 
 
 
 Support
 
 
+------------------+--------------+---------+-----------------+
| Name             | Relationship | Address | Phone           |
+------------------+--------------+---------+-----------------+
| Alvino Escamilla | ECON         | Unknown | [phone] |
+------------------+--------------+---------+-----------------+
 
 
 
 Care Team Providers
 
 
 
+-----------------------+------+-------------+
| Care Team Member Name | Role | Phone       |
+-----------------------+------+-------------+
 PCP  | Unavailable |
+-----------------------+------+-------------+
 
 
 
 Encounter Details
 
 
+--------+----------+----------------------+----------------------+-------------+
| Date   | Type     | Department           | Care Team            | Description |
+--------+----------+----------------------+----------------------+-------------+
| / | Imaging  |   [facility] |   Provider,          |             |
|    | Exam     |  MED CTR EXTERNAL    | [address]  [address] |             |
|        |          | IMAGING  [address]       |  [address]        |             |
|        |          | [address]  [place]     | [place] [address]     |             |
|        |          | [place] [address] |                      |             |
|        |          |   [phone]       |                      |             |
+--------+----------+----------------------+----------------------+-------------+
 
 
 
 Social History
 
 
+----------------+-------+-----------+--------+------+
| Tobacco Use    | Types | Packs/Day | Years  | Date |
|                |       |           | Used   |      |
+----------------+-------+-----------+--------+------+
| Never Assessed |       |           |        |      |
+----------------+-------+-----------+--------+------+
 
 
 
+------------------+---------------+
| Sex Assigned at  | Date Recorded |
| Birth            |               |
+------------------+---------------+
| Not on file      |               |
+------------------+---------------+
 
 
 
+----------------+-------------+-------------+
| Job Start Date | Occupation  | Industry    |
+----------------+-------------+-------------+
| Not on file    | Not on file | Not on file |
+----------------+-------------+-------------+
 
 
 
+----------------+--------------+------------+
| Travel History | Travel Start | Travel End |
+----------------+--------------+------------+
 
 
 
 
+-------------------------------------+
| No recent travel history available. |
+-------------------------------------+
 documented as of this encounter
 
 Plan of Treatment
 Not on filedocumented as of this encounter
 
 Procedures
 
 
+---------------------+--------+-------------+----------------------+----------------------+
| Procedure Name      | Priori | Date/Time   | Associated Diagnosis | Comments             |
|                     | ty     |             |                      |                      |
+---------------------+--------+-------------+----------------------+----------------------+
| CLYDE DIGITAL         | Routin | 2010  |                      |   Results for this   |
| SCREENING BILATERAL | e      |  1:40 PM    |                      | procedure are in the |
|                     |        | PST         |                      |  results section.    |
+---------------------+--------+-------------+----------------------+----------------------+
 documented in this encounter
 
 Results
 CLYDE Digital Screening Bilateral (2010  1:40 PM PST)
 
+----------+
| Specimen |
+----------+
|          |
+----------+
 
 
 
+------------------------------------------------------------------------+---------------+
| Narrative                                                              | Performed At  |
+------------------------------------------------------------------------+---------------+
|   This result has an attachment that is not available.  External films |   [facility] |
|  for comparison only - no result from [place].                      |               |
+------------------------------------------------------------------------+---------------+
 
 
 
+---------------+---------+--------------------+--------------+
| Performing    | Address | City/State/Zipcode | Phone Number |
| Organization  |         |                    |              |
+---------------+---------+--------------------+--------------+
|   [facility] |         |                    |              |
+---------------+---------+--------------------+--------------+
 documented in this encounter
 
 Visit Diagnoses
 Not on filedocumented in this encounter

## 2020-05-13 NOTE — XMS
Encounter Summary
  Created on: 2020
 
 Sarah Escamilla
 External Reference #: [id number]
 : 60
 Sex: Female
 
 Demographics
 
 
+-----------------------+----------------------+
| Address               | [address] |
|                       | [place]  [address] |
+-----------------------+----------------------+
| Home Phone            | [phone]      |
+-----------------------+----------------------+
| Preferred Language    | Unknown              |
+-----------------------+----------------------+
| Marital Status        |               |
+-----------------------+----------------------+
| Yazidism Affiliation | Unknown              |
+-----------------------+----------------------+
| Race                  | Unknown              |
+-----------------------+----------------------+
| Ethnic Group          | Unknown              |
+-----------------------+----------------------+
 
 
 Author
 
 
+--------------+--------------------------------------------+
| Author       | [facility]  |
|              | and [facility]                                |
+--------------+--------------------------------------------+
| Organization | [facility]  |
|              | and [facility]                                |
+--------------+--------------------------------------------+
| Address      | Unknown                                    |
+--------------+--------------------------------------------+
| Phone        | Unavailable                                |
+--------------+--------------------------------------------+
 
 
 
 Support
 
 
+------------------+--------------+---------+-----------------+
| Name             | Relationship | Address | Phone           |
+------------------+--------------+---------+-----------------+
| Alvino Escamilla | ECON         | Unknown | [phone] |
+------------------+--------------+---------+-----------------+
 
 
 
 Care Team Providers
 
 
 
+-----------------------+------+-----------------+
| Care Team Member Name | Role | Phone           |
+-----------------------+------+-----------------+
| Unknown, Doctor       | PCP  | [phone] |
+-----------------------+------+-----------------+
 
 
 
 Encounter Details
 
 
+--------+----------+----------------------+----------------------+-------------+
| Date   | Type     | Department           | Care Team            | Description |
+--------+----------+----------------------+----------------------+-------------+
| / | Imaging  |   [facility] |   Provider,          |             |
| 2017   | Exam     |  [facility] EXTER[facility]    | [address]  [address] |             |
|        |          | IMAG[address]  [address]       |  [address]        |             |
|        |          | [address] [place]     | [place] [id number]     |             |
|        |          | [place] [address] |                      |             |
|        |          |   [phone]       |                      |             |
+--------+----------+----------------------+----------------------+-------------+
 
 
 
 Social History
 
 
+----------------+-------+-----------+--------+------+
| Tobacco Use    | Types | Packs/Day | Years  | Date |
|                |       |           | Used   |      |
+----------------+-------+-----------+--------+------+
| Never Assessed |       |           |        |      |
+----------------+-------+-----------+--------+------+
 
 
 
+------------------+---------------+
| Sex Assigned at  | Date Recorded |
| Birth            |               |
+------------------+---------------+
| Not on file      |               |
+------------------+---------------+
 
 
 
+----------------+-------------+-------------+
| Job Start Date | Occupation  | Industry    |
+----------------+-------------+-------------+
| Not on file    | Not on file | Not on file |
+----------------+-------------+-------------+
 
 
 
+----------------+--------------+------------+
| Travel History | Travel Start | Travel End |
+----------------+--------------+------------+
 
 
 
 
+-------------------------------------+
| No recent travel history available. |
+-------------------------------------+
 documented as of this encounter
 
 Plan of Treatment
 Not on filedocumented as of this encounter
 
 Procedures
 
 
+------------------+--------+-------------+----------------------+----------------------+
| Procedure Name   | Priori | Date/Time   | Associated Diagnosis | Comments             |
|                  | ty     |             |                      |                      |
+------------------+--------+-------------+----------------------+----------------------+
| CLYDE DIGITAL      | Routin | 2016  |                      |   Results for this   |
| DIAGNOSTIC RIGHT | e      | 10:20 AM    |                      | procedure are in the |
|                  |        | PDT         |                      |  results section.    |
+------------------+--------+-------------+----------------------+----------------------+
 documented in this encounter
 
 Results
 CLYDE Digital Diagnostic Right (2016 10:20 AM PDT)
 
+----------+
| Specimen |
+----------+
|          |
+----------+
 
 
 
+------------------------------------------------------------------------+---------------+
| Narrative                                                              | Performed At  |
+------------------------------------------------------------------------+---------------+
|   This result has an attachment that is not available.  External films |   [facility] |
|  for comparison only - no result from [place].                      |               |
+------------------------------------------------------------------------+---------------+
 
 
 
+---------------+---------+--------------------+--------------+
| Performing    | Address | City/State/Zipcode | Phone Number |
| Organization  |         |                    |              |
+---------------+---------+--------------------+--------------+
|   [facility] |         |                    |              |
+---------------+---------+--------------------+--------------+
 documented in this encounter
 
 Visit Diagnoses
 Not on filedocumented in this encounter

## 2020-05-13 NOTE — XMS
Encounter Summary
  Created on: 2020
 
 Sarah Escamilla
 External Reference #: [id number]
 : 60
 Sex: Female
 
 Demographics
 
 
+-----------------------+----------------------+
| Address               | [address] |
|                       | [place]  [address] |
+-----------------------+----------------------+
| Home Phone            | [phone]      |
+-----------------------+----------------------+
| Preferred Language    | Unknown              |
+-----------------------+----------------------+
| Marital Status        |               |
+-----------------------+----------------------+
| Evangelical Affiliation | Unknown              |
+-----------------------+----------------------+
| Race                  | Unknown              |
+-----------------------+----------------------+
| Ethnic Group          | Unknown              |
+-----------------------+----------------------+
 
 
 Author
 
 
+--------------+--------------------------------------------+
| Author       | [facility] [place]  |
|              | and [place]                                |
+--------------+--------------------------------------------+
| Organization | [facility] [place]  |
|              | and [place]                                |
+--------------+--------------------------------------------+
| Address      | Unknown                                    |
+--------------+--------------------------------------------+
| Phone        | Unavailable                                |
+--------------+--------------------------------------------+
 
 
 
 Support
 
 
+------------------+--------------+---------+-----------------+
| Name             | Relationship | Address | Phone           |
+------------------+--------------+---------+-----------------+
| Alvino Escamilla | ECON         | Unknown | [phone] |
+------------------+--------------+---------+-----------------+
 
 
 
 Care Team Providers
 
 
 
+-----------------------+------+-------------+
| Care Team Member Name | Role | Phone       |
+-----------------------+------+-------------+
 PCP  | Unavailable |
+-----------------------+------+-------------+
 
 
 
 Encounter Details
 
 
+--------+----------+----------------------+----------------------+-------------+
| Date   | Type     | Department           | Care Team            | Description |
+--------+----------+----------------------+----------------------+-------------+
| / | Imaging  |   [facility] |   Provider,          |             |
|    | Exam     |  MED [facility] EXTERNAL    | [facility] [address]  [address] |             |
|        |          | IMAGING  [address]       |  [address]        |             |
|        |          | [address]  [place]     | [place] [address]     |             |
|        |          | [place] [address] |                      |             |
|        |          |   [phone]       |                      |             |
+--------+----------+----------------------+----------------------+-------------+
 
 
 
 Social History
 
 
+----------------+-------+-----------+--------+------+
| Tobacco Use    | Types | Packs/Day | Years  | Date |
|                |       |           | Used   |      |
+----------------+-------+-----------+--------+------+
| Never Assessed |       |           |        |      |
+----------------+-------+-----------+--------+------+
 
 
 
+------------------+---------------+
| Sex Assigned at  | Date Recorded |
| Birth            |               |
+------------------+---------------+
| Not on file      |               |
+------------------+---------------+
 
 
 
+----------------+-------------+-------------+
| Job Start Date | Occupation  | Industry    |
+----------------+-------------+-------------+
| Not on file    | Not on file | Not on file |
+----------------+-------------+-------------+
 
 
 
+----------------+--------------+------------+
| Travel History | Travel Start | Travel End |
+----------------+--------------+------------+
 
 
 
 
+-------------------------------------+
| No recent travel history available. |
+-------------------------------------+
 documented as of this encounter
 
 Plan of Treatment
 Not on filedocumented as of this encounter
 
 Procedures
 
 
+---------------------+--------+-------------+----------------------+----------------------+
| Procedure Name      | Priori | Date/Time   | Associated Diagnosis | Comments             |
|                     | ty     |             |                      |                      |
+---------------------+--------+-------------+----------------------+----------------------+
| CLYDE DIGITAL         | Routin | 2008  |                      |   Results for this   |
| SCREENING BILATERAL | e      | 11:20 AM    |                      | procedure are in the |
|                     |        | PDT         |                      |  results section.    |
+---------------------+--------+-------------+----------------------+----------------------+
 documented in this encounter
 
 Results
 CLYDE Digital Screening Bilateral (2008 11:20 AM PDT)
 
+----------+
| Specimen |
+----------+
|          |
+----------+
 
 
 
+------------------------------------------------------------------------+---------------+
| Narrative                                                              | Performed At  |
+------------------------------------------------------------------------+---------------+
|   This result has an attachment that is not available.  External films |   [facility] |
|  for comparison only - no result from [place].                      |               |
+------------------------------------------------------------------------+---------------+
 
 
 
+---------------+---------+--------------------+--------------+
| Performing    | Address | City/State/Zipcode | Phone Number |
| Organization  |         |                    |              |
+---------------+---------+--------------------+--------------+
|   [facility] |         |                    |              |
+---------------+---------+--------------------+--------------+
 documented in this encounter
 
 Visit Diagnoses
 Not on filedocumented in this encounter

## 2020-05-13 NOTE — XMS
Encounter Summary
  Created on: 2020
 
 Sarah Escamilla
 External Reference #: [id number]
 : 60
 Sex: Female
 
 Demographics
 
 
+-----------------------+----------------------+
| Address               | [address] |
|                       | [place]  [address] |
+-----------------------+----------------------+
| Home Phone            | [phone]      |
+-----------------------+----------------------+
| Preferred Language    | Unknown              |
+-----------------------+----------------------+
| Marital Status        |               |
+-----------------------+----------------------+
| Gnosticist Affiliation | Unknown              |
+-----------------------+----------------------+
| Race                  | Unknown              |
+-----------------------+----------------------+
| Ethnic Group          | Unknown              |
+-----------------------+----------------------+
 
 
 Author
 
 
+--------------+--------------------------------------------+
| Author       | [facility] [place]  |
|              | and [place]                                |
+--------------+--------------------------------------------+
| Organization | [facility] [place]  |
|              | and [place]                                |
+--------------+--------------------------------------------+
| Address      | Unknown                                    |
+--------------+--------------------------------------------+
| Phone        | Unavailable                                |
+--------------+--------------------------------------------+
 
 
 
 Support
 
 
+------------------+--------------+---------+-----------------+
| Name             | Relationship | Address | Phone           |
+------------------+--------------+---------+-----------------+
| Alvino Escamilla | ECON         | Unknown | [phone] |
+------------------+--------------+---------+-----------------+
 
 
 
 Care Team Providers
 
 
 
+-----------------------+------+-------------+
| Care Team Member Name | Role | Phone       |
+-----------------------+------+-------------+
 PCP  | Unavailable |
+-----------------------+------+-------------+
 
 
 
 Encounter Details
 
 
+--------+----------+----------------------+----------------------+-------------+
| Date   | Type     | Department           | Care Team            | Description |
+--------+----------+----------------------+----------------------+-------------+
| / | Imaging  |   [facility] |   Provider,          |             |
|    | Exam     |  MED CTR EXTERNAL    | [facility] [address]  [address] |             |
|        |          | IMAGING  [address]       |  [address]        |             |
|        |          | [address]  [place]     | [place] [address]     |             |
|        |          | [place] [address] |                      |             |
|        |          |   [phone]       |                      |             |
+--------+----------+----------------------+----------------------+-------------+
 
 
 
 Social History
 
 
+----------------+-------+-----------+--------+------+
| Tobacco Use    | Types | Packs/Day | Years  | Date |
|                |       |           | Used   |      |
+----------------+-------+-----------+--------+------+
| Never Assessed |       |           |        |      |
+----------------+-------+-----------+--------+------+
 
 
 
+------------------+---------------+
| Sex Assigned at  | Date Recorded |
| Birth            |               |
+------------------+---------------+
| Not on file      |               |
+------------------+---------------+
 
 
 
+----------------+-------------+-------------+
| Job Start Date | Occupation  | Industry    |
+----------------+-------------+-------------+
| Not on file    | Not on file | Not on file |
+----------------+-------------+-------------+
 
 
 
+----------------+--------------+------------+
| Travel History | Travel Start | Travel End |
+----------------+--------------+------------+
 
 
 
 
+-------------------------------------+
| No recent travel history available. |
+-------------------------------------+
 documented as of this encounter
 
 Plan of Treatment
 Not on filedocumented as of this encounter
 
 Procedures
 
 
+---------------------+--------+-------------+----------------------+----------------------+
| Procedure Name      | Priori | Date/Time   | Associated Diagnosis | Comments             |
|                     | ty     |             |                      |                      |
+---------------------+--------+-------------+----------------------+----------------------+
| CLYDE DIGITAL         | Routin | 2010  |                      |   Results for this   |
| SCREENING BILATERAL | e      |  1:40 PM    |                      | procedure are in the |
|                     |        | PST         |                      |  results section.    |
+---------------------+--------+-------------+----------------------+----------------------+
 documented in this encounter
 
 Results
 CLYDE Digital Screening Bilateral (2010  1:40 PM PST)
 
+----------+
| Specimen |
+----------+
|          |
+----------+
 
 
 
+------------------------------------------------------------------------+---------------+
| Narrative                                                              | Performed At  |
+------------------------------------------------------------------------+---------------+
|   This result has an attachment that is not available.  External films |   [facility] |
|  for comparison only - no result from [place].                      |               |
+------------------------------------------------------------------------+---------------+
 
 
 
+---------------+---------+--------------------+--------------+
| Performing    | Address | City/State/Zipcode | Phone Number |
| Organization  |         |                    |              |
+---------------+---------+--------------------+--------------+
|   [facility] |         |                    |              |
+---------------+---------+--------------------+--------------+
 documented in this encounter
 
 Visit Diagnoses
 Not on filedocumented in this encounter

## 2020-05-13 NOTE — XMS
Encounter Summary
  Created on: 2020
 
 Sarah Escamilla
 External Reference #: [id number]
 : 60
 Sex: Female
 
 Demographics
 
 
+-----------------------+----------------------+
| Address               | [address] |
|                       | [place]  [address] |
+-----------------------+----------------------+
| Home Phone            | [phone]      |
+-----------------------+----------------------+
| Preferred Language    | Unknown              |
+-----------------------+----------------------+
| Marital Status        |               |
+-----------------------+----------------------+
| Restorationism Affiliation | Unknown              |
+-----------------------+----------------------+
| Race                  | Unknown              |
+-----------------------+----------------------+
| Ethnic Group          | Unknown              |
+-----------------------+----------------------+
 
 
 Author
 
 
+--------------+--------------------------------------------+
| Author       | [facility]  |
|              | and [facility]                                |
+--------------+--------------------------------------------+
| Organization | [facility]  |
|              | and [facility]                                |
+--------------+--------------------------------------------+
| Address      | Unknown                                    |
+--------------+--------------------------------------------+
| Phone        | Unavailable                                |
+--------------+--------------------------------------------+
 
 
 
 Support
 
 
+------------------+--------------+---------+-----------------+
| Name             | Relationship | Address | Phone           |
+------------------+--------------+---------+-----------------+
| Alvino Escamilla | ECON         | Unknown | [phone] |
+------------------+--------------+---------+-----------------+
 
 
 
 Care Team Providers
 
 
 
+-----------------------+------+-----------------+
| Care Team Member Name | Role | Phone           |
+-----------------------+------+-----------------+
| Unknown, Doctor       | PCP  | [phone] |
+-----------------------+------+-----------------+
 
 
 
 Encounter Details
 
 
+--------+----------+----------------------+----------------------+-------------+
| Date   | Type     | Department           | Care Team            | Description |
+--------+----------+----------------------+----------------------+-------------+
| / | Imaging  |   [facility] |   Provider,          |             |
| 2017   | Exam     |  [facility]    | [id number] |             |
|        |          | IMAGING  [address]       |  [address]        |             |
|        |          | [address] [place]     | [place] [id number]     |             |
|        |          | [place] [address] |                      |             |
|        |          |   [phone]       |                      |             |
+--------+----------+----------------------+----------------------+-------------+
 
 
 
 Social History
 
 
+----------------+-------+-----------+--------+------+
| Tobacco Use    | Types | Packs/Day | Years  | Date |
|                |       |           | Used   |      |
+----------------+-------+-----------+--------+------+
| Never Assessed |       |           |        |      |
+----------------+-------+-----------+--------+------+
 
 
 
+------------------+---------------+
| Sex Assigned at  | Date Recorded |
| Birth            |               |
+------------------+---------------+
| Not on file      |               |
+------------------+---------------+
 
 
 
+----------------+-------------+-------------+
| Job Start Date | Occupation  | Industry    |
+----------------+-------------+-------------+
| Not on file    | Not on file | Not on file |
+----------------+-------------+-------------+
 
 
 
+----------------+--------------+------------+
| Travel History | Travel Start | Travel End |
+----------------+--------------+------------+
 
 
 
 
+-------------------------------------+
| No recent travel history available. |
+-------------------------------------+
 documented as of this encounter
 
 Plan of Treatment
 Not on filedocumented as of this encounter
 
 Procedures
 
 
+--------------------+--------+-------------+----------------------+----------------------+
| Procedure Name     | Priori | Date/Time   | Associated Diagnosis | Comments             |
|                    | ty     |             |                      |                      |
+--------------------+--------+-------------+----------------------+----------------------+
| US BREAST LIMITED  | Routin | 2016  |                      |   Results for this   |
| RIGHT              | e      | 10:45 AM    |                      | procedure are in the |
|                    |        | PDT         |                      |  results section.    |
+--------------------+--------+-------------+----------------------+----------------------+
 documented in this encounter
 
 Results
 US Breast Limited Right (2016 10:45 AM PDT)
 
+----------+
| Specimen |
+----------+
|          |
+----------+
 
 
 
+------------------------------------------------------------------------+---------------+
| Narrative                                                              | Performed At  |
+------------------------------------------------------------------------+---------------+
|   This result has an attachment that is not available.  External films |   [facility] |
|  for comparison only - no result from [place].                      |               |
+------------------------------------------------------------------------+---------------+
 
 
 
+---------------+---------+--------------------+--------------+
| Performing    | Address | City/State/Zipcode | Phone Number |
| Organization  |         |                    |              |
+---------------+---------+--------------------+--------------+
|   [facility] |         |                    |              |
+---------------+---------+--------------------+--------------+
 documented in this encounter
 
 Visit Diagnoses
 Not on filedocumented in this encounter

## 2020-05-13 NOTE — XMS
Encounter Summary
  Created on: 2020
 
 Sarah Escamilla
 External Reference #: [id number]
 : 60
 Sex: Female
 
 Demographics
 
 
+-----------------------+----------------------+
| Address               | [address] |
|                       | [place]  [address] |
+-----------------------+----------------------+
| Home Phone            | [phone]      |
+-----------------------+----------------------+
| Preferred Language    | Unknown              |
+-----------------------+----------------------+
| Marital Status        |               |
+-----------------------+----------------------+
| Congregation Affiliation | Unknown              |
+-----------------------+----------------------+
| Race                  | Unknown              |
+-----------------------+----------------------+
| Ethnic Group          | Unknown              |
+-----------------------+----------------------+
 
 
 Author
 
 
+--------------+--------------------------------------------+
| Author       | [facility]  |
|              | and [facility]                                |
+--------------+--------------------------------------------+
| Organization | [facility]  |
|              | and [place]                                |
+--------------+--------------------------------------------+
| Address      | Unknown                                    |
+--------------+--------------------------------------------+
| Phone        | Unavailable                                |
+--------------+--------------------------------------------+
 
 
 
 Support
 
 
+------------------+--------------+---------+-----------------+
| Name             | Relationship | Address | Phone           |
+------------------+--------------+---------+-----------------+
| Alvino Escamilla | ECON         | Unknown | [phone] |
+------------------+--------------+---------+-----------------+
 
 
 
 Care Team Providers
 
 
 
+-----------------------+------+-----------------+
| Care Team Member Name | Role | Phone           |
+-----------------------+------+-----------------+
| Unknown, Doctor       | PCP  | [phone] |
+-----------------------+------+-----------------+
 
 
 
 Encounter Details
 
 
+--------+----------+----------------------+----------------------+-------------+
| Date   | Type     | Department           | Care Team            | Description |
+--------+----------+----------------------+----------------------+-------------+
| / | Imaging  |   [facility] |   Provider,          |             |
| 2017   | Exam     |  [facility]    | [id number] |             |
|        |          | IMAGING  [address]       |  [address]        |             |
|        |          | [address] [place]     | [place] [id number]     |             |
|        |          | [place] [address] |                      |             |
|        |          |   [phone]       |                      |             |
+--------+----------+----------------------+----------------------+-------------+
 
 
 
 Social History
 
 
+----------------+-------+-----------+--------+------+
| Tobacco Use    | Types | Packs/Day | Years  | Date |
|                |       |           | Used   |      |
+----------------+-------+-----------+--------+------+
| Never Assessed |       |           |        |      |
+----------------+-------+-----------+--------+------+
 
 
 
+------------------+---------------+
| Sex Assigned at  | Date Recorded |
| Birth            |               |
+------------------+---------------+
| Not on file      |               |
+------------------+---------------+
 
 
 
+----------------+-------------+-------------+
| Job Start Date | Occupation  | Industry    |
+----------------+-------------+-------------+
| Not on file    | Not on file | Not on file |
+----------------+-------------+-------------+
 
 
 
+----------------+--------------+------------+
| Travel History | Travel Start | Travel End |
+----------------+--------------+------------+
 
 
 
 
+-------------------------------------+
| No recent travel history available. |
+-------------------------------------+
 documented as of this encounter
 
 Plan of Treatment
 Not on filedocumented as of this encounter
 
 Procedures
 
 
+---------------------+--------+-------------+----------------------+----------------------+
| Procedure Name      | Priori | Date/Time   | Associated Diagnosis | Comments             |
|                     | ty     |             |                      |                      |
+---------------------+--------+-------------+----------------------+----------------------+
| CLYDE DIGITAL         | Routin | 10/26/2017  |                      |   Results for this   |
| SCREENING BILATERAL | e      | 11:40 AM    |                      | procedure are in the |
|                     |        | PDT         |                      |  results section.    |
+---------------------+--------+-------------+----------------------+----------------------+
 documented in this encounter
 
 Results
 CLYDE Digital Screening Bilateral (10/26/2017 11:40 AM PDT)
 
+----------+
| Specimen |
+----------+
|          |
+----------+
 
 
 
+------------------------------------------------------------------------+---------------+
| Narrative                                                              | Performed At  |
+------------------------------------------------------------------------+---------------+
|   This result has an attachment that is not available.  External films |   [facility] |
|  for comparison only - no result from [place].                      |               |
+------------------------------------------------------------------------+---------------+
 
 
 
+---------------+---------+--------------------+--------------+
| Performing    | Address | City/State/Zipcode | Phone Number |
| Organization  |         |                    |              |
+---------------+---------+--------------------+--------------+
|   [facility] |         |                    |              |
+---------------+---------+--------------------+--------------+
 documented in this encounter
 
 Visit Diagnoses
 Not on filedocumented in this encounter

## 2020-05-13 NOTE — XMS
Encounter Summary
  Created on: 2020
 
 Sarah Escamilla
 External Reference #: [id number]
 : 60
 Sex: Female
 
 Demographics
 
 
+-----------------------+----------------------+
| Address               | [address] |
|                       | [place]  [address] |
+-----------------------+----------------------+
| Home Phone            | [phone]      |
+-----------------------+----------------------+
| Preferred Language    | Unknown              |
+-----------------------+----------------------+
| Marital Status        |               |
+-----------------------+----------------------+
| Mormon Affiliation | Unknown              |
+-----------------------+----------------------+
| Race                  | Unknown              |
+-----------------------+----------------------+
| Ethnic Group          | Unknown              |
+-----------------------+----------------------+
 
 
 Author
 
 
+--------------+--------------------------------------------+
| Author       | [facility]  |
|              | and [place]                                |
+--------------+--------------------------------------------+
| Organization | [facility]  |
|              | and [place]                                |
+--------------+--------------------------------------------+
| Address      | Unknown                                    |
+--------------+--------------------------------------------+
| Phone        | Unavailable                                |
+--------------+--------------------------------------------+
 
 
 
 Support
 
 
+------------------+--------------+---------+-----------------+
| Name             | Relationship | Address | Phone           |
+------------------+--------------+---------+-----------------+
| Alvino Escamilla | ECON         | Unknown | [phone] |
+------------------+--------------+---------+-----------------+
 
 
 
 Care Team Providers
 
 
 
+-----------------------+------+-----------------+
| Care Team Member Name | Role | Phone           |
+-----------------------+------+-----------------+
| Unknown, Doctor       | PCP  | [phone] |
+-----------------------+------+-----------------+
 
 
 
 Encounter Details
 
 
+--------+----------+----------------------+----------------------+-------------+
| Date   | Type     | Department           | Care Team            | Description |
+--------+----------+----------------------+----------------------+-------------+
| / | Imaging  |   [facility] |   Provider,          |             |
| 2017   | Exam     |  [facility]    | [id number] |             |
|        |          | IMAGING  [address]       |  [address]        |             |
|        |          | [address] [place]     | [place] [address]     |             |
|        |          | [place] [address] |                      |             |
|        |          |   [phone]       |                      |             |
+--------+----------+----------------------+----------------------+-------------+
 
 
 
 Social History
 
 
+----------------+-------+-----------+--------+------+
| Tobacco Use    | Types | Packs/Day | Years  | Date |
|                |       |           | Used   |      |
+----------------+-------+-----------+--------+------+
| Never Assessed |       |           |        |      |
+----------------+-------+-----------+--------+------+
 
 
 
+------------------+---------------+
| Sex Assigned at  | Date Recorded |
| Birth            |               |
+------------------+---------------+
| Not on file      |               |
+------------------+---------------+
 
 
 
+----------------+-------------+-------------+
| Job Start Date | Occupation  | Industry    |
+----------------+-------------+-------------+
| Not on file    | Not on file | Not on file |
+----------------+-------------+-------------+
 
 
 
+----------------+--------------+------------+
| Travel History | Travel Start | Travel End |
+----------------+--------------+------------+
 
 
 
 
+-------------------------------------+
| No recent travel history available. |
+-------------------------------------+
 documented as of this encounter
 
 Plan of Treatment
 Not on filedocumented as of this encounter
 
 Procedures
 
 
+---------------------+--------+-------------+----------------------+----------------------+
| Procedure Name      | Priori | Date/Time   | Associated Diagnosis | Comments             |
|                     | ty     |             |                      |                      |
+---------------------+--------+-------------+----------------------+----------------------+
| CLYDE DIGITAL         | Routin | 10/21/2015  |                      |   Results for this   |
| SCREENING BILATERAL | e      |  3:20 PM    |                      | procedure are in the |
|                     |        | PDT         |                      |  results section.    |
+---------------------+--------+-------------+----------------------+----------------------+
 documented in this encounter
 
 Results
 CLYDE Digital Screening Bilateral (10/21/2015  3:20 PM PDT)
 
+----------+
| Specimen |
+----------+
|          |
+----------+
 
 
 
+------------------------------------------------------------------------+---------------+
| Narrative                                                              | Performed At  |
+------------------------------------------------------------------------+---------------+
|   This result has an attachment that is not available.  External films |   [facility] |
|  for comparison only - no result from [place].                      |               |
+------------------------------------------------------------------------+---------------+
 
 
 
+---------------+---------+--------------------+--------------+
| Performing    | Address | City/State/Zipcode | Phone Number |
| Organization  |         |                    |              |
+---------------+---------+--------------------+--------------+
|   [facility] |         |                    |              |
+---------------+---------+--------------------+--------------+
 documented in this encounter
 
 Visit Diagnoses
 Not on filedocumented in this encounter

## 2020-05-13 NOTE — XMS
Encounter Summary
  Created on: 2020
 
 Sarah Escamilla
 External Reference #: [id number]
 : 60
 Sex: Female
 
 Demographics
 
 
+-----------------------+----------------------+
| Address               | [address] |
|                       | [place]  [address] |
+-----------------------+----------------------+
| Home Phone            | [phone]      |
+-----------------------+----------------------+
| Preferred Language    | Unknown              |
+-----------------------+----------------------+
| Marital Status        |               |
+-----------------------+----------------------+
| Alevism Affiliation | Unknown              |
+-----------------------+----------------------+
| Race                  | Unknown              |
+-----------------------+----------------------+
| Ethnic Group          | Unknown              |
+-----------------------+----------------------+
 
 
 Author
 
 
+--------------+--------------------------------------------+
| Author       | [facility] [place]  |
|              | and [place]                                |
+--------------+--------------------------------------------+
| Organization | [facility] [place]  |
|              | and [place]                                |
+--------------+--------------------------------------------+
| Address      | Unknown                                    |
+--------------+--------------------------------------------+
| Phone        | Unavailable                                |
+--------------+--------------------------------------------+
 
 
 
 Support
 
 
+------------------+--------------+---------+-----------------+
| Name             | Relationship | Address | Phone           |
+------------------+--------------+---------+-----------------+
| Alvino Escamilla | ECON         | Unknown | [phone] |
+------------------+--------------+---------+-----------------+
 
 
 
 Care Team Providers
 
 
 
+-----------------------+------+-----------------+
| Care Team Member Name | Role | Phone           |
+-----------------------+------+-----------------+
| Unknown, Doctor       | PCP  | [phone] |
+-----------------------+------+-----------------+
 
 
 
 Encounter Details
 
 
+--------+----------+----------------------+----------------------+-------------+
| Date   | Type     | Department           | Care Team            | Description |
+--------+----------+----------------------+----------------------+-------------+
| / | Imaging  |   [facility] |   Provider,          |             |
| 2017   | Exam     |  [facility]    | [id number] |             |
|        |          | IMAGING  [address]       |  [address]        |             |
|        |          | [address] [place]     | [place] [id number]     |             |
|        |          | [place] [id number] |                      |             |
|        |          |   [phone]       |                      |             |
+--------+----------+----------------------+----------------------+-------------+
 
 
 
 Social History
 
 
+----------------+-------+-----------+--------+------+
| Tobacco Use    | Types | Packs/Day | Years  | Date |
|                |       |           | Used   |      |
+----------------+-------+-----------+--------+------+
| Never Assessed |       |           |        |      |
+----------------+-------+-----------+--------+------+
 
 
 
+------------------+---------------+
| Sex Assigned at  | Date Recorded |
| Birth            |               |
+------------------+---------------+
| Not on file      |               |
+------------------+---------------+
 
 
 
+----------------+-------------+-------------+
| Job Start Date | Occupation  | Industry    |
+----------------+-------------+-------------+
| Not on file    | Not on file | Not on file |
+----------------+-------------+-------------+
 
 
 
+----------------+--------------+------------+
| Travel History | Travel Start | Travel End |
+----------------+--------------+------------+
 
 
 
 
+-------------------------------------+
| No recent travel history available. |
+-------------------------------------+
 documented as of this encounter
 
 Plan of Treatment
 Not on filedocumented as of this encounter
 
 Procedures
 
 
+-----------------+--------+-------------+----------------------+----------------------+
| Procedure Name  | Priori | Date/Time   | Associated Diagnosis | Comments             |
|                 | ty     |             |                      |                      |
+-----------------+--------+-------------+----------------------+----------------------+
| CLYDE DIGITAL     | Routin | 2017  |                      |   Results for this   |
| DIAGNOSTIC LEFT | e      | 11:05 AM    |                      | procedure are in the |
|                 |        | PST         |                      |  results section.    |
+-----------------+--------+-------------+----------------------+----------------------+
 documented in this encounter
 
 Results
 CLYDE Digital Diagnostic Left (2017 11:05 AM PST)
 
+----------+
| Specimen |
+----------+
|          |
+----------+
 
 
 
+------------------------------------------------------------------------+---------------+
| Narrative                                                              | Performed At  |
+------------------------------------------------------------------------+---------------+
|   This result has an attachment that is not available.  External films |   [facility] |
|  for comparison only - no result from [place].                      |               |
+------------------------------------------------------------------------+---------------+
 
 
 
+---------------+---------+--------------------+--------------+
| Performing    | Address | City/State/Zipcode | Phone Number |
| Organization  |         |                    |              |
+---------------+---------+--------------------+--------------+
|   [facility] |         |                    |              |
+---------------+---------+--------------------+--------------+
 documented in this encounter
 
 Visit Diagnoses
 Not on filedocumented in this encounter

## 2020-05-13 NOTE — XMS
Encounter Summary
  Created on: 2020
 
 Sarah Escamilla
 External Reference #: [id number]
 : 60
 Sex: Female
 
 Demographics
 
 
+-----------------------+----------------------+
| Address               | [address] |
|                       | [place]  [address] |
+-----------------------+----------------------+
| Home Phone            | [phone]      |
+-----------------------+----------------------+
| Preferred Language    | Unknown              |
+-----------------------+----------------------+
| Marital Status        |               |
+-----------------------+----------------------+
| Worship Affiliation | Unknown              |
+-----------------------+----------------------+
| Race                  | Unknown              |
+-----------------------+----------------------+
| Ethnic Group          | Unknown              |
+-----------------------+----------------------+
 
 
 Author
 
 
+--------------+--------------------------------------------+
| Author       | [facility] [place]  |
|              | and [place]                                |
+--------------+--------------------------------------------+
| Organization | [facility] [place]  |
|              | and [place]                                |
+--------------+--------------------------------------------+
| Address      | Unknown                                    |
+--------------+--------------------------------------------+
| Phone        | Unavailable                                |
+--------------+--------------------------------------------+
 
 
 
 Support
 
 
+------------------+--------------+---------+-----------------+
| Name             | Relationship | Address | Phone           |
+------------------+--------------+---------+-----------------+
| Alvino Escamilla | ECON         | Unknown | [phone] |
+------------------+--------------+---------+-----------------+
 
 
 
 Care Team Providers
 
 
 
+-----------------------+------+-----------------+
| Care Team Member Name | Role | Phone           |
+-----------------------+------+-----------------+
| Unknown, Doctor       | PCP  | [phone] |
+-----------------------+------+-----------------+
 
 
 
 Encounter Details
 
 
+--------+----------+----------------------+----------------------+-------------+
| Date   | Type     | Department           | Care Team            | Description |
+--------+----------+----------------------+----------------------+-------------+
| / | Imaging  |   [facility] |   Provider,          |             |
| 2017   | Exam     |  [facility]    | [id number] |             |
|        |          | IMAGING  [address]       |  [address]        |             |
|        |          | [address] [place]     | [place] [address]     |             |
|        |          | [place] [address] |                      |             |
|        |          |   [phone]       |                      |             |
+--------+----------+----------------------+----------------------+-------------+
 
 
 
 Social History
 
 
+----------------+-------+-----------+--------+------+
| Tobacco Use    | Types | Packs/Day | Years  | Date |
|                |       |           | Used   |      |
+----------------+-------+-----------+--------+------+
| Never Assessed |       |           |        |      |
+----------------+-------+-----------+--------+------+
 
 
 
+------------------+---------------+
| Sex Assigned at  | Date Recorded |
| Birth            |               |
+------------------+---------------+
| Not on file      |               |
+------------------+---------------+
 
 
 
+----------------+-------------+-------------+
| Job Start Date | Occupation  | Industry    |
+----------------+-------------+-------------+
| Not on file    | Not on file | Not on file |
+----------------+-------------+-------------+
 
 
 
+----------------+--------------+------------+
| Travel History | Travel Start | Travel End |
+----------------+--------------+------------+
 
 
 
 
+-------------------------------------+
| No recent travel history available. |
+-------------------------------------+
 documented as of this encounter
 
 Plan of Treatment
 Not on filedocumented as of this encounter
 
 Procedures
 
 
+---------------------+--------+-------------+----------------------+----------------------+
| Procedure Name      | Priori | Date/Time   | Associated Diagnosis | Comments             |
|                     | ty     |             |                      |                      |
+---------------------+--------+-------------+----------------------+----------------------+
| CLYDE DIGITAL         | Routin | 10/25/2016  |                      |   Results for this   |
| SCREENING BILATERAL | e      | 12:50 PM    |                      | procedure are in the |
|                     |        | PDT         |                      |  results section.    |
+---------------------+--------+-------------+----------------------+----------------------+
 documented in this encounter
 
 Results
 CLYDE Digital Screening Bilateral (10/25/2016 12:50 PM PDT)
 
+----------+
| Specimen |
+----------+
|          |
+----------+
 
 
 
+------------------------------------------------------------------------+---------------+
| Narrative                                                              | Performed At  |
+------------------------------------------------------------------------+---------------+
|   This result has an attachment that is not available.  External films |   [facility] |
|  for comparison only - no result from [place].                      |               |
+------------------------------------------------------------------------+---------------+
 
 
 
+---------------+---------+--------------------+--------------+
| Performing    | Address | City/State/Zipcode | Phone Number |
| Organization  |         |                    |              |
+---------------+---------+--------------------+--------------+
|   [facility] |         |                    |              |
+---------------+---------+--------------------+--------------+
 documented in this encounter
 
 Visit Diagnoses
 Not on filedocumented in this encounter

## 2020-05-13 NOTE — XMS
Encounter Summary
  Created on: 2020
 
 Sarah Escamilla
 External Reference #: [id number]
 : 60
 Sex: Female
 
 Demographics
 
 
+-----------------------+----------------------+
| Address               | [address] |
|                       | [place]  [address] |
+-----------------------+----------------------+
| Home Phone            | [phone]      |
+-----------------------+----------------------+
| Preferred Language    | Unknown              |
+-----------------------+----------------------+
| Marital Status        |               |
+-----------------------+----------------------+
| Mormonism Affiliation | Unknown              |
+-----------------------+----------------------+
| Race                  | Unknown              |
+-----------------------+----------------------+
| Ethnic Group          | Unknown              |
+-----------------------+----------------------+
 
 
 Author
 
 
+--------------+--------------------------------------------+
| Author       | [facility] [place]  |
|              | and [place]                                |
+--------------+--------------------------------------------+
| Organization | [facility] [place]  |
|              | and [place]                                |
+--------------+--------------------------------------------+
| Address      | Unknown                                    |
+--------------+--------------------------------------------+
| Phone        | Unavailable                                |
+--------------+--------------------------------------------+
 
 
 
 Support
 
 
+------------------+--------------+---------+-----------------+
| Name             | Relationship | Address | Phone           |
+------------------+--------------+---------+-----------------+
| Alvino Escamilla | ECON         | Unknown | [phone] |
+------------------+--------------+---------+-----------------+
 
 
 
 Care Team Providers
 
 
 
+-----------------------+------+-----------------+
| Care Team Member Name | Role | Phone           |
+-----------------------+------+-----------------+
| Unknown, Doctor       | PCP  | [phone] |
+-----------------------+------+-----------------+
 
 
 
 Encounter Details
 
 
+--------+----------+----------------------+----------------------+-------------+
| Date   | Type     | Department           | Care Team            | Description |
+--------+----------+----------------------+----------------------+-------------+
| / | Imaging  |   [facility] |   Provider,          |             |
| 2017   | Exam     |  [facility] EXTER[facility]    | [id number] |             |
|        |          | IMAGING  [address]       |  [address]        |             |
|        |          | [address] [place]     | [place] [address]     |             |
|        |          | [place] [address] |                      |             |
|        |          |   [phone]       |                      |             |
+--------+----------+----------------------+----------------------+-------------+
 
 
 
 Social History
 
 
+----------------+-------+-----------+--------+------+
| Tobacco Use    | Types | Packs/Day | Years  | Date |
|                |       |           | Used   |      |
+----------------+-------+-----------+--------+------+
| Never Assessed |       |           |        |      |
+----------------+-------+-----------+--------+------+
 
 
 
+------------------+---------------+
| Sex Assigned at  | Date Recorded |
| Birth            |               |
+------------------+---------------+
| Not on file      |               |
+------------------+---------------+
 
 
 
+----------------+-------------+-------------+
| Job Start Date | Occupation  | Industry    |
+----------------+-------------+-------------+
| Not on file    | Not on file | Not on file |
+----------------+-------------+-------------+
 
 
 
+----------------+--------------+------------+
| Travel History | Travel Start | Travel End |
+----------------+--------------+------------+
 
 
 
 
+-------------------------------------+
| No recent travel history available. |
+-------------------------------------+
 documented as of this encounter
 
 Plan of Treatment
 Not on filedocumented as of this encounter
 
 Procedures
 
 
+--------------------+--------+-------------+----------------------+----------------------+
| Procedure Name     | Priori | Date/Time   | Associated Diagnosis | Comments             |
|                    | ty     |             |                      |                      |
+--------------------+--------+-------------+----------------------+----------------------+
| US BREAST LIMITED  | Routin | 2017  |                      |   Results for this   |
| LEFT               | e      | 11:10 AM    |                      | procedure are in the |
|                    |        | PST         |                      |  results section.    |
+--------------------+--------+-------------+----------------------+----------------------+
 documented in this encounter
 
 Results
 US Breast Limited Left (2017 11:10 AM PST)
 
+----------+
| Specimen |
+----------+
|          |
+----------+
 
 
 
+------------------------------------------------------------------------+---------------+
| Narrative                                                              | Performed At  |
+------------------------------------------------------------------------+---------------+
|   This result has an attachment that is not available.  External films |   [facility] |
|  for comparison only - no result from [place].                      |               |
+------------------------------------------------------------------------+---------------+
 
 
 
+---------------+---------+--------------------+--------------+
| Performing    | Address | City/State/Zipcode | Phone Number |
| Organization  |         |                    |              |
+---------------+---------+--------------------+--------------+
|   [facility] |         |                    |              |
+---------------+---------+--------------------+--------------+
 documented in this encounter
 
 Visit Diagnoses
 Not on filedocumented in this encounter

## 2020-05-13 NOTE — XMS
Encounter Summary
  Created on: 2020
 
 Sarah Escamilla
 External Reference #: [id number]
 : 60
 Sex: Female
 
 Demographics
 
 
+-----------------------+----------------------+
| Address               | [address] |
|                       | [place]  [address] |
+-----------------------+----------------------+
| Home Phone            | [phone]      |
+-----------------------+----------------------+
| Preferred Language    | Unknown              |
+-----------------------+----------------------+
| Marital Status        |               |
+-----------------------+----------------------+
| Samaritan Affiliation | Unknown              |
+-----------------------+----------------------+
| Race                  | Unknown              |
+-----------------------+----------------------+
| Ethnic Group          | Unknown              |
+-----------------------+----------------------+
 
 
 Author
 
 
+--------------+--------------------------------------------+
| Author       | [facility]  |
|              | and [facility]                                |
+--------------+--------------------------------------------+
| Organization | [facility]  |
|              | and [place]                                |
+--------------+--------------------------------------------+
| Address      | Unknown                                    |
+--------------+--------------------------------------------+
| Phone        | Unavailable                                |
+--------------+--------------------------------------------+
 
 
 
 Support
 
 
+------------------+--------------+---------+-----------------+
| Name             | Relationship | Address | Phone           |
+------------------+--------------+---------+-----------------+
| Alvino Escamilla | ECON         | Unknown | [phone] |
+------------------+--------------+---------+-----------------+
 
 
 
 Care Team Providers
 
 
 
+-----------------------+------+-----------------+
| Care Team Member Name | Role | Phone           |
+-----------------------+------+-----------------+
| Unknown, Doctor       | PCP  | [phone] |
+-----------------------+------+-----------------+
 
 
 
 Encounter Details
 
 
+--------+----------+----------------------+----------------------+-------------+
| Date   | Type     | Department           | Care Team            | Description |
+--------+----------+----------------------+----------------------+-------------+
| / | Imaging  |   [facility] |   Provider,          |             |
| 2017   | Exam     |  [facility]    | [id number] |             |
|        |          | IMAGING  [address]       |  [address]        |             |
|        |          | [address] [place]     | [place] [id number]     |             |
|        |          | [place] [id number] |                      |             |
|        |          |   [phone]       |                      |             |
+--------+----------+----------------------+----------------------+-------------+
 
 
 
 Social History
 
 
+----------------+-------+-----------+--------+------+
| Tobacco Use    | Types | Packs/Day | Years  | Date |
|                |       |           | Used   |      |
+----------------+-------+-----------+--------+------+
| Never Assessed |       |           |        |      |
+----------------+-------+-----------+--------+------+
 
 
 
+------------------+---------------+
| Sex Assigned at  | Date Recorded |
| Birth            |               |
+------------------+---------------+
| Not on file      |               |
+------------------+---------------+
 
 
 
+----------------+-------------+-------------+
| Job Start Date | Occupation  | Industry    |
+----------------+-------------+-------------+
| Not on file    | Not on file | Not on file |
+----------------+-------------+-------------+
 
 
 
+----------------+--------------+------------+
| Travel History | Travel Start | Travel End |
+----------------+--------------+------------+
 
 
 
 
+-------------------------------------+
| No recent travel history available. |
+-------------------------------------+
 documented as of this encounter
 
 Plan of Treatment
 Not on filedocumented as of this encounter
 
 Procedures
 
 
+--------------------+--------+-------------+----------------------+----------------------+
| Procedure Name     | Priori | Date/Time   | Associated Diagnosis | Comments             |
|                    | ty     |             |                      |                      |
+--------------------+--------+-------------+----------------------+----------------------+
| US BREAST LIMITED  | Routin | 2015  |                      |   Results for this   |
| LEFT               | e      |  3:25 PM    |                      | procedure are in the |
|                    |        | PST         |                      |  results section.    |
+--------------------+--------+-------------+----------------------+----------------------+
 documented in this encounter
 
 Results
 US Breast Limited Left (2015  3:25 PM PST)
 
+----------+
| Specimen |
+----------+
|          |
+----------+
 
 
 
+------------------------------------------------------------------------+---------------+
| Narrative                                                              | Performed At  |
+------------------------------------------------------------------------+---------------+
|   This result has an attachment that is not available.  External films |   [facility] |
|  for comparison only - no result from [facility].                      |               |
+------------------------------------------------------------------------+---------------+
 
 
 
+---------------+---------+--------------------+--------------+
| Performing    | Address | City/State/Zipcode | Phone Number |
| Organization  |         |                    |              |
+---------------+---------+--------------------+--------------+
|   [facility] |         |                    |              |
+---------------+---------+--------------------+--------------+
 documented in this encounter
 
 Visit Diagnoses
 Not on filedocumented in this encounter

## 2020-05-13 NOTE — XMS
Encounter Summary
  Created on: 2020
 
 Sarah Escamilla
 External Reference #: [id number]
 : 60
 Sex: Female
 
 Demographics
 
 
+-----------------------+----------------------+
| Address               | [address] |
|                       | [place]  [address] |
+-----------------------+----------------------+
| Home Phone            | [phone]      |
+-----------------------+----------------------+
| Preferred Language    | Unknown              |
+-----------------------+----------------------+
| Marital Status        |               |
+-----------------------+----------------------+
| Zoroastrian Affiliation | Unknown              |
+-----------------------+----------------------+
| Race                  | Unknown              |
+-----------------------+----------------------+
| Ethnic Group          | Unknown              |
+-----------------------+----------------------+
 
 
 Author
 
 
+--------------+--------------------------------------------+
| Author       | [facility] [place]  |
|              | and [place]                                |
+--------------+--------------------------------------------+
| Organization | [facility] [place]  |
|              | and [place]                                |
+--------------+--------------------------------------------+
| Address      | Unknown                                    |
+--------------+--------------------------------------------+
| Phone        | Unavailable                                |
+--------------+--------------------------------------------+
 
 
 
 Support
 
 
+------------------+--------------+---------+-----------------+
| Name             | Relationship | Address | Phone           |
+------------------+--------------+---------+-----------------+
| Alvino Escamilla | ECON         | Unknown | [phone] |
+------------------+--------------+---------+-----------------+
 
 
 
 Care Team Providers
 
 
 
+-----------------------+------+-----------------+
| Care Team Member Name | Role | Phone           |
+-----------------------+------+-----------------+
| Unknown, Doctor       | PCP  | [phone] |
+-----------------------+------+-----------------+
 
 
 
 Encounter Details
 
 
+--------+----------+----------------------+----------------------+-------------+
| Date   | Type     | Department           | Care Team            | Description |
+--------+----------+----------------------+----------------------+-------------+
| / | Imaging  |   [facility] |   Provider,          |             |
| 2017   | Exam     |  [facility]    | [address] |             |
|        |          | [address]AGING  [address]       |  [address]        |             |
|        |          | [address] [place]     | [place] [id number]     |             |
|        |          | [place] [address] |                      |             |
|        |          |   [phone]       |                      |             |
+--------+----------+----------------------+----------------------+-------------+
 
 
 
 Social History
 
 
+----------------+-------+-----------+--------+------+
| Tobacco Use    | Types | Packs/Day | Years  | Date |
|                |       |           | Used   |      |
+----------------+-------+-----------+--------+------+
| Never Assessed |       |           |        |      |
+----------------+-------+-----------+--------+------+
 
 
 
+------------------+---------------+
| Sex Assigned at  | Date Recorded |
| Birth            |               |
+------------------+---------------+
| Not on file      |               |
+------------------+---------------+
 
 
 
+----------------+-------------+-------------+
| Job Start Date | Occupation  | Industry    |
+----------------+-------------+-------------+
| Not on file    | Not on file | Not on file |
+----------------+-------------+-------------+
 
 
 
+----------------+--------------+------------+
| Travel History | Travel Start | Travel End |
+----------------+--------------+------------+
 
 
 
 
+-------------------------------------+
| No recent travel history available. |
+-------------------------------------+
 documented as of this encounter
 
 Plan of Treatment
 Not on filedocumented as of this encounter
 
 Procedures
 
 
+--------------------+--------+-------------+----------------------+----------------------+
| Procedure Name     | Priori | Date/Time   | Associated Diagnosis | Comments             |
|                    | ty     |             |                      |                      |
+--------------------+--------+-------------+----------------------+----------------------+
| US BREAST LIMITED  | Routin | 2015  |                      |   Results for this   |
| LEFT               | e      |  3:25 PM    |                      | procedure are in the |
|                    |        | PST         |                      |  results section.    |
+--------------------+--------+-------------+----------------------+----------------------+
 documented in this encounter
 
 Results
 US Breast Limited Left (2015  3:25 PM PST)
 
+----------+
| Specimen |
+----------+
|          |
+----------+
 
 
 
+------------------------------------------------------------------------+---------------+
| Narrative                                                              | Performed At  |
+------------------------------------------------------------------------+---------------+
|   This result has an attachment that is not available.  External films |   [facility] |
|  for comparison only - no result from [facility].                      |               |
+------------------------------------------------------------------------+---------------+
 
 
 
+---------------+---------+--------------------+--------------+
| Performing    | Address | City/State/Zipcode | Phone Number |
| Organization  |         |                    |              |
+---------------+---------+--------------------+--------------+
|   [facility] |         |                    |              |
+---------------+---------+--------------------+--------------+
 documented in this encounter
 
 Visit Diagnoses
 Not on filedocumented in this encounter

## 2020-05-13 NOTE — XMS
Encounter Summary
  Created on: 2020
 
 Sarah Escamilla
 External Reference #: [id number]
 : 60
 Sex: Female
 
 Demographics
 
 
+-----------------------+----------------------+
| Address               | [address] |
|                       | [place]  [address] |
+-----------------------+----------------------+
| Home Phone            | [phone]      |
+-----------------------+----------------------+
| Preferred Language    | Unknown              |
+-----------------------+----------------------+
| Marital Status        |               |
+-----------------------+----------------------+
| Gnosticism Affiliation | Unknown              |
+-----------------------+----------------------+
| Race                  | Unknown              |
+-----------------------+----------------------+
| Ethnic Group          | Unknown              |
+-----------------------+----------------------+
 
 
 Author
 
 
+--------------+--------------------------------------------+
| Author       | [facility] [place]  |
|              | and [place]                                |
+--------------+--------------------------------------------+
| Organization | [facility] [place]  |
|              | and [place]                                |
+--------------+--------------------------------------------+
| Address      | Unknown                                    |
+--------------+--------------------------------------------+
| Phone        | Unavailable                                |
+--------------+--------------------------------------------+
 
 
 
 Support
 
 
+------------------+--------------+---------+-----------------+
| Name             | Relationship | Address | Phone           |
+------------------+--------------+---------+-----------------+
| Alvino Escamilla | ECON         | Unknown | [phone] |
+------------------+--------------+---------+-----------------+
 
 
 
 Care Team Providers
 
 
 
+-----------------------+------+-----------------+
| Care Team Member Name | Role | Phone           |
+-----------------------+------+-----------------+
| Unknown, Doctor       | PCP  | [phone] |
+-----------------------+------+-----------------+
 
 
 
 Encounter Details
 
 
+--------+----------+----------------------+----------------------+-------------+
| Date   | Type     | Department           | Care Team            | Description |
+--------+----------+----------------------+----------------------+-------------+
| / | Imaging  |   [facility] |   Provider,          |             |
| 2017   | Exam     |  [facility]    | [address] |             |
|        |          | IMAGING  [address]       |  [address]        |             |
|        |          | [address] [place]     | [place] [id number]     |             |
|        |          | [place] [id number] |                      |             |
|        |          |   [phone]       |                      |             |
+--------+----------+----------------------+----------------------+-------------+
 
 
 
 Social History
 
 
+----------------+-------+-----------+--------+------+
| Tobacco Use    | Types | Packs/Day | Years  | Date |
|                |       |           | Used   |      |
+----------------+-------+-----------+--------+------+
| Never Assessed |       |           |        |      |
+----------------+-------+-----------+--------+------+
 
 
 
+------------------+---------------+
| Sex Assigned at  | Date Recorded |
| Birth            |               |
+------------------+---------------+
| Not on file      |               |
+------------------+---------------+
 
 
 
+----------------+-------------+-------------+
| Job Start Date | Occupation  | Industry    |
+----------------+-------------+-------------+
| Not on file    | Not on file | Not on file |
+----------------+-------------+-------------+
 
 
 
+----------------+--------------+------------+
| Travel History | Travel Start | Travel End |
+----------------+--------------+------------+
 
 
 
 
+-------------------------------------+
| No recent travel history available. |
+-------------------------------------+
 documented as of this encounter
 
 Plan of Treatment
 Not on filedocumented as of this encounter
 
 Procedures
 
 
+---------------------+--------+-------------+----------------------+----------------------+
| Procedure Name      | Priori | Date/Time   | Associated Diagnosis | Comments             |
|                     | ty     |             |                      |                      |
+---------------------+--------+-------------+----------------------+----------------------+
| CLYDE DIGITAL         | Routin | 09/10/2014  |                      |   Results for this   |
| SCREENING BILATERAL | e      | 10:45 AM    |                      | procedure are in the |
|                     |        | PDT         |                      |  results section.    |
+---------------------+--------+-------------+----------------------+----------------------+
 documented in this encounter
 
 Results
 CLYDE Digital Screening Bilateral (09/10/2014 10:45 AM PDT)
 
+----------+
| Specimen |
+----------+
|          |
+----------+
 
 
 
+------------------------------------------------------------------------+---------------+
| Narrative                                                              | Performed At  |
+------------------------------------------------------------------------+---------------+
|   This result has an attachment that is not available.  External films |   [facility] |
|  for comparison only - no result from [facility].                      |               |
+------------------------------------------------------------------------+---------------+
 
 
 
+---------------+---------+--------------------+--------------+
| Performing    | Address | City/State/Zipcode | Phone Number |
| Organization  |         |                    |              |
+---------------+---------+--------------------+--------------+
|   [facility] |         |                    |              |
+---------------+---------+--------------------+--------------+
 documented in this encounter
 
 Visit Diagnoses
 Not on filedocumented in this encounter

## 2020-05-13 NOTE — XMS
Clinical Summary
  Created on: 2020
 
 Sarah Escamilla
 External Reference #: [id number]
 : 60
 Sex: Female
 
 Demographics
 
 
+-----------------------+----------------------+
| Address               | [address] |
|                       | [place]  [address] |
+-----------------------+----------------------+
| Home Phone            | [phone]      |
+-----------------------+----------------------+
| Preferred Language    | Unknown              |
+-----------------------+----------------------+
| Marital Status        |               |
+-----------------------+----------------------+
| Congregational Affiliation | Unknown              |
+-----------------------+----------------------+
| Race                  | Unknown              |
+-----------------------+----------------------+
| Ethnic Group          | Unknown              |
+-----------------------+----------------------+
 
 
 Author
 
 
+--------------+--------------------------------------------+
| Author       | [facility]  |
|              | [facility]                                |
+--------------+--------------------------------------------+
| Organization | [facility]  |
|              | [facility]                                |
+--------------+--------------------------------------------+
| Address      | Unknown                                    |
+--------------+--------------------------------------------+
| Phone        | Unavailable                                |
+--------------+--------------------------------------------+
 
 
 
 Support
 
 
+------------------+--------------+---------+-----------------+
| Name             | Relationship | Address | Phone           |
+------------------+--------------+---------+-----------------+
| Alvino Escamilla | ECON         | Unknown | [phone] |
+------------------+--------------+---------+-----------------+
 
 
 
 Care Team Providers
 
 
 
+-----------------------+------+-----------------+
| Care Team Member Name | Role | Phone           |
+-----------------------+------+-----------------+
| Unknown, Doctor       | PCP  | [phone] |
+-----------------------+------+-----------------+
 
 
 
 Allergies
 Not on File
 
 Medications
 Not on file
 
 Active Problems
 Not on file
 
 Social History
 
 
+----------------+-------+-----------+--------+------+
| Tobacco Use    | Types | Packs/Day | Years  | Date |
|                |       |           | Used   |      |
+----------------+-------+-----------+--------+------+
| Never Assessed |       |           |        |      |
+----------------+-------+-----------+--------+------+
 
 
 
+------------------+---------------+
| Sex Assigned at  | Date Recorded |
| Birth            |               |
+------------------+---------------+
| Not on file      |               |
+------------------+---------------+
 
 
 
+----------------+-------------+-------------+
| Job Start Date | Occupation  | Industry    |
+----------------+-------------+-------------+
| Not on file    | Not on file | Not on file |
+----------------+-------------+-------------+
 
 
 
+----------------+--------------+------------+
| Travel History | Travel Start | Travel End |
+----------------+--------------+------------+
 
 
 
+-------------------------------------+
| No recent travel history available. |
+-------------------------------------+
 
 
 
 
 Last Filed Vital Signs
 Not on file
 
 Plan of Treatment
 
 
+---------------------+-----------+---------------------------------+----------+
| Health Maintenance  | Due Date  | Last Done                       | Comments |
+---------------------+-----------+---------------------------------+----------+
| Vaccine:            |  |                                 |          |
| Dtap/Tdap/Td (1 -   | 1         |                                 |          |
| Tdap)               |           |                                 |          |
+---------------------+-----------+---------------------------------+----------+
| Cervical Cancer     |  |                                 |          |
| Screening (Pap)     | 0         |                                 |          |
+---------------------+-----------+---------------------------------+----------+
| Vaccine: Zoster (1  |  |                                 |          |
| of 2)               | 0         |                                 |          |
+---------------------+-----------+---------------------------------+----------+
| Breast Cancer       | 10/26/201 | 10/26/2017, 10/25/2016,         |          |
| Screening           | 9         | 10/21/2015, Additional history  |          |
|                     |           | exists                          |          |
+---------------------+-----------+---------------------------------+----------+
| Vaccine: Influenza  |  |                                 |          |
| (Season Ended)      | 0         |                                 |          |
+---------------------+-----------+---------------------------------+----------+
 
 
 
 Results
 Not on filefrom Last 3 Months
 
 Insurance
 
 
+-----------+--------+-------------+--------+-------------+---------+------+
| Payer     | Benefi | Subscriber  | Effect | Phone       | Address | Type |
|           | t Plan | ID          | elizabeth    |             |         |      |
|           |  /     |             | Dates  |             |         |      |
|           | Group  |             |        |             |         |      |
+-----------+--------+-------------+--------+-------------+---------+------+
| [employer] | HEALTH | [id number]   | 20 | [phone] |         | PPO  |
|           | NET    |             | 14-Pre | 1           |         |      |
|           | PPO    |             | sent   |             |         |      |
+-----------+--------+-------------+--------+-------------+---------+------+
 
 
 
+-------------------+--------+-------------+--------+-------------+---------------------+
| Guarantor Name    | Accoun | Relation to | Date   | Phone       | Billing Address     |
|                   | t Type |  Patient    | of     |             |                     |
|                   |        |             | Birth  |             |                     |
+-------------------+--------+-------------+--------+-------------+---------------------+
| Sarah Escamilla | Person | Self        | / |             |   [address]  |
|                   | al/Fam |             | 1960   | [phone] | [place]  |
|                   | [place]    |             |        | [address] (Home)    | [id number]               |
+-------------------+--------+-------------+--------+-------------+---------------------+
 
 
 
 
 Advance Directives
 
 
+-----------+-----------------+----------------+-------------+
| Type      | Date Recorded   | Patient        | Explanation |
|           |                 | Representative |             |
+-----------+-----------------+----------------+-------------+
| Power of  |                 |                |             |
|   |                 |                |             |
+-----------+-----------------+----------------+-------------+
| Advance   | 1/10/2018  8:28 |                |             |
| Directive |  AM             |                |             |
+-----------+-----------------+----------------+-------------+

## 2020-05-14 PROCEDURE — 0DTJ0ZZ RESECTION OF APPENDIX, OPEN APPROACH: ICD-10-PCS | Performed by: SURGERY

## 2020-05-14 PROCEDURE — 0UB50ZZ EXCISION OF RIGHT FALLOPIAN TUBE, OPEN APPROACH: ICD-10-PCS | Performed by: SURGERY

## 2020-05-14 NOTE — EKG
[facility]
                                    [address]
                                  [place]  [address]
_________________________________________________________________________________________
                                                                 Signed   
 
 
Normal sinus rhythm
Normal ECG
No previous ECGs available
Confirmed by NICOLE PETER MD ([id number]) on 5/14/2020 3:55:02 PM
 
 
 
 
 
 
 
 
 
 
 
 
 
 
 
 
 
 
 
 
 
 
 
 
 
 
 
 
 
 
 
 
 
 
 
 
 
 
 
 
 
    Electronically Signed By: NICOLE PETER MD  05/14/20 1555
_________________________________________________________________________________________
PATIENT NAME:     PHILLIP GEORGE                     
MEDICAL RECORD #: [id number]                     Electrocardiogram             
          ACCT #: [id number]  
DATE OF BIRTH:   [birth year]                                       
PHYSICIAN:   NICOLE PETER MD           REPORT #: [id number]
REPORT IS CONFIDENTIAL AND NOT TO BE RELEASED WITHOUT AUTHORIZATION

## 2020-05-15 NOTE — OR
[facility]
                                    [address], [place]  [address]
_________________________________________________________________________________________
                                                                 Signed   
 
 
DATE OF OPERATION:
05/14/2020
 
SURGEON:
René Jar[place] MD
 
PREOPERATIVE DIAGNOSIS:
Ruptured appendicitis with abscess.
 
POSTOPERATIVE DIAGNOSIS:
Ruptured necrotic appendicitis with abscess.
 
PROCEDURES:
1. Open appendectomy.
2. Partial salpingectomy.
3. Placement of a retrocecal drain.
4. Abscess cultures.
 
ESTIMATED BLOOD LOSS:
Minimal.
 
FINDINGS:
Sarah had a short necrotic ruptured appendix with appendicitis involving the cecum,
terminal ileum, sigmoid colon in the right ovary and fallopian tube. 
 
INDICATIONS:
Sarah is a 59-year-old female at 5 feet 5 inches and 92 kg.  She is generally healthy.
At least a week if not longer, she has been having lower abdominal pain, it is somewhat
vague.  She was not able to localize it very well to the left or the right.
Consequently, there was some consideration she had viral gastroenteritis.  However, the
pain persisted if not worsen.  She had been in contact with her primary care provider
throughout this Coronavirus pandemic.  She had been sent to our x-ray department for CT
scan of abdomen and pelvis.  She had a 7 cm abscess with bubbles in the right lower
quadrant associated with the terminal ileum in the cecum.  Consequently, I have been
asked to admit her last evening as a General Surgeon on-call.  Sarah's process was quite
localized, so she was not systemically ill or toxic.  Her potassium was on the low side.
 She was little bit dehydrated, it was already getting late.  We decided to admit
overnight for IV hydration, replacement of the potassium, and start her on antibiotics.
Our plan was then to bring her to surgery first thing this morning.  I explained to
Sarah and her  over the telephone the above findings.  We did discuss the
location and function of the appendix.  We had reviewed laparoscopic versus open
appendectomy.  In this situation, we like a midline incision, it needs to be extended
 
    Electronically Signed By: RENÉ JARAMILLO MD  05/15/20 0735
_________________________________________________________________________________________
PATIENT NAME:     SARAH GEORGE                     
MEDICAL RECORD #: [id number]            OPERATIVE REPORT              
          ACCT #: [id number]  
DATE OF BIRTH:   [birth year]            REPORT #: [id number]      
PHYSICIAN:        RENÉ JARAMILLO MD             
PCP:              ANTONIA KUNZ MD           
REPORT IS CONFIDENTIAL AND NOT TO BE RELEASED WITHOUT AUTHORIZATION
 
 
                                  [facility]
                                    [address] [place] [address]  [address]
_________________________________________________________________________________________
                                                                 Signed   
 
 
and so forth for visualization.  I explained the expected intraop and postop course.  We
did review the risks including, but not limited to bleeding, infection, scarring, change
in contour of the skin, damage to bowel, appendiceal stump leak, postoperative
intraabdominal abscess, incisional hernias, and other unforeseen comorbidities.  They
had expressed understanding and wished to proceed. 
 
PROCEDURE NOTE:
I met with Sarah this morning in our preoperative area.  We once again reviewed her
findings.  After that, we took Sarah into the operating room.  She was placed in the
supine position under general endotracheal tube anesthesia.  She was already on her
preoperative antibiotics along with subcutaneous heparin.  SCDs were utilized.  Smith
catheter was inserted with return of clear yellow urine without difficulty.  She was
then prepped and draped in the usual sterile fashion.  We utilized a standard
infraumbilical midline incision which is slightly deanna than my hand.  We were able to
enter the abdomen without difficulty.  We could easily palpate the right lower quadrant
mass after induction of anesthesia.  We could easily see the right colon coming down to
the cecum.  We followed the anterior taenia coli right down the abscess cavity and we
broke up the abscess cavity with our fingertips.  Copious amounts of pus were evacuated.
 We took deep wound cultures from the abscess cavity.  We could see that the sigmoid
colon was partially involved, mainly the epiploica on the colon.  The right ovary was
quite atrophic and we easily identified and the fallopian tube came around and the
fimbriae was involved.  It took a minute to divide the fimbriae from the appendix.  At
that point, there was not much tissue left holding the fimbriae in place.  We just used
cautery to divide that and about 1.5 to 2 cm of the fallopian tube was thus removed and
sent off to the Pathology Department.  We had washed out the cavity several times and
examined it very carefully.  We rotated the cecum and the terminal ileum.  The mesentry
to the terminal ileum quite thickened from the abscess cavity.  We could see the
appendix at the base of the anterior taenia coli.  Her appendix appears to be very short
maybe 2-1/2 at 3 cm at most.  We could see the very indurated mesoappendix coming up as
well.  We searched very very carefully and we did not feel the appendix was divided in
half.  It simply appears to be a very short appendix.  The area was quite indurated and
inflamed, so I simply divided the appendix with cautery and over-sewed the stump with a
figure-of-eight 0 Vicryl suture.  I divided the mesoappendix very slowly with the
cautery and all those vessels were quite inflamed and thrombosed and there was no
bleeding, whatsoever.  I did open the appendix on the anti-mesenteric side with a #15
blade knife and it was quite rubbery and indurated.  Our pathologist was not available
at that point in the morning, so no frozen sections were obtained.  After this, we
brought a #10 flat Gopi drain to the right lower quadrant of the abdomen and underneath
the cecum and into the abscess cavity.  The drain was held in place at the level of skin
with a 2-0 nylon suture.  The cecum and bowel were to returned to the position and then
the omentum was brought down over the area.  The midline fascia was closed with
interrupted figure-of-eight #1 PDS sutures.  Local anesthetic was injected in the
 
    Electronically Signed By: RENÉ JARAMILLO MD  05/15/20 0735
_________________________________________________________________________________________
PATIENT NAME:     SARAH GEORGE                     
MEDICAL RECORD #: [id number]            OPERATIVE REPORT              
          ACCT #: [id number]  
DATE OF BIRTH:   [birth year]            REPORT #: [id number]      
PHYSICIAN:        RENÉ JARAMILLO MD             
PCP:              ANTONIA KUNZ MD           
REPORT IS CONFIDENTIAL AND NOT TO BE RELEASED WITHOUT AUTHORIZATION
 
 
                                  [facility]
                                    [address] [place] [address]  [address]
_________________________________________________________________________________________
                                                                 Signed   
 
 
abdominal wall and the subcutaneous tissues.  The wound was then copiously irrigated
once again.  We brought the dermis together with interrupted 3-0 Monocryl subcuticular
stitches.  Skin edges were then 
reapproximated with staples.  Dry gauze and tape were then applied.  Sarah was awakened
from anesthesia, extubated in the OR, and taken to recovery room in stable condition. 
 
 
 
            ________________________________________
            René [place] MD 
 
 
ALB/MODL
Job #:  [id number]
DD:  05/14/2020 09:36:24
DT:  05/14/2020 11:30:22
 
cc:            MD Antonia Santoyo MD
 
 
Copies:  RENÉ JARAMILLO MD, JONATHAN MD
~
 
 
 
 
 
 
 
 
 
 
 
 
 
 
 
 
 
 
    Electronically Signed By: RENÉ JARAMILLO MD  05/15/20 0735
_________________________________________________________________________________________
PATIENT NAME:     SARAH GEROGE                     
MEDICAL RECORD #: [id number]            OPERATIVE REPORT              
          ACCT #: [id number]  
DATE OF BIRTH:   [birth year]            REPORT #: [id number]      
PHYSICIAN:        RENÉ JARAMILLO MD             
PCP:              ANTONIA KUNZ MD           
REPORT IS CONFIDENTIAL AND NOT TO BE RELEASED WITHOUT AUTHORIZATION

## 2020-05-20 NOTE — PATH
[facility]
                                    [address] [place], [place]  [address]
_________________________________________________________________________________________
                                                                 Signed   
 
 
 
**** THIS IS AN ADDENDUM REPORT ****
 
SPECIMEN(S): A RIGHT FALLOPIAN TUBE AND FIMBRIAE
SPECIMEN(S): B PROBABLE APPENDIX
 
SPECIMEN SOURCE:
A. RIGHT FALLOPIAN TUBE AND FIMBRIAE
B. PROBABLE APPENDIX
 
CLINICAL HISTORY:
Lower quadrant abscess, possible ruptured appendicitis.
 
FINAL PATHOLOGIC DIAGNOSIS:
A.  Right fallopian tube and fimbria:
-  Fallopian tube with surrounding acute serositis and fibrosis enveloping 
paratubal cysts. 
B.  Probable appendix:
-  Appendix with marked acute appendicitis and surrounding abscess formation.
-  Extension of suppuration into the adjacent fallopian tube is present.
 
COMMENT:
Regarding part B, additional sections of the appendix are pending, although 
these findings are consistent with an acute appendicitis with surrounding 
abscess formation. 
TWK:caw:C2NR
 
MICROSCOPIC EXAMINATION:
Histologic sections of all submitted blocks are examined by light microscopy.  
These findings, together with the gross examination, support the pathologic 
diagnosis. 
 
GROSS DESCRIPTION:
Two specimens are received in two containers, labeled "CW."
A.  The specimen, labeled "CW," and designated "right fallopian tube," per the 
requisition is received in formalin and consists of a segment of pink-tan to 
brown-tan and congested, fimbriated, 
fallopian tube (3.5 cm in length x 2.5 cm in diameter).  The specimen is 
serially sectioned to reveal a pink-tan, edematous cut surface.  Representative 
sections are submitted cassette (A1). 
B.  The specimen, labeled "CW," and designated "probable appendix," per the 
requisition is received in formalin and consists of an irregular portion of 
 
                                                                                    
_________________________________________________________________________________________
PATIENT NAME:     PHILLIP GEORGE                     
MEDICAL RECORD #: [id number]            PATHOLOGY                     
          ACCT #: [id number]       ACCESSION #: [id number]     
DATE OF BIRTH:   [birth year]            REPORT #: [id number]       
PHYSICIAN:        MISHA HUMPHREY              
PCP:              ANTONIA KUNZ MD           
REPORT IS CONFIDENTIAL AND NOT TO BE RELEASED WITHOUT AUTHORIZATION
 
 
                                  [facility]
                                    [address] [place], [place]  [address]
_________________________________________________________________________________________
                                                                 Signed   
 
 
brown-tan soft tissue that is hemorrhagic, and 
congested, with white-tan exudate (3.4 x 3.0 x 1.3 cm).  The specimen is 
serially sectioned to reveal a pink-tan, hemorrhagic possible disrupted 
appendix.  Representative sections are submitted in 
cassette (B1).
AC (under the direct supervision of a pathologist)
 
The Gross Description was prepared using a voice recognition system.  The 
report was reviewed for accuracy; however, sound-alike word errors, addition 
and/or deletions may occur.  If there is any 
question about this report, please contact Client Services.
 
PERFORMING LABORATORY:
The technical component was performed by [facility], [address] [place] [address] (Medical Director: Martina Schmidt MD; CLIA# [id number]).  
Professional interpretation was performed by 
[facility] [address] (Medical Director:  Valeriano Baum DO; CLIA#:  [id number]. 
 
REASON FOR ADDENDUM:
To report the findings of additional sections submitted for examination.
 
ADDENDUM COMMENT:
Two additional sections were submitted on specimen B.  These findings reveal no 
new information.  There is no change in the above diagnosis. 
TWK:alban
Professional interpretation was performed by [facility], [place] [address] (Medical 
Director:  Valeriano Baum DO; CLIA#:  [id number]. 
 
Diagnostician:  Gene Kilgore MD
Pathologist
Electronically Signed 05/20/2020
 
 
Copies:                                
~
 
 
 
 
 
 
                                                                                    
_________________________________________________________________________________________
PATIENT NAME:     PHILLIP GEORGE                     
MEDICAL RECORD #: [id number]            PATHOLOGY                     
          ACCT #: [id number]       ACCESSION #: [id number]     
DATE OF BIRTH:   [birth year]            REPORT #: [id number]       
PHYSICIAN:        MISHA PATHOLOGY              
PCP:              ANTONIA KUNZ MD           
REPORT IS CONFIDENTIAL AND NOT TO BE RELEASED WITHOUT AUTHORIZATION

## 2020-05-22 NOTE — DS
[facility]
                                    [address], [place]  [address]
_________________________________________________________________________________________
                                                                 Signed   
 
 
ADMISSION DATE:  05/13/2020
 
DISCHARGE DATE:  05/21/2020
 
PROCEDURE:
Open appendectomy.
 
HISTORY OF PRESENT ILLNESS:
Sarah is a 59-year-old female who had at least a week of lower abdominal pain.  It was
both right and left side.  She had been working with her primary care provider.  She was
sent over to the Radiology Department for a CT scan of abdomen and pelvis.  She had a 7
cm fluid collection in the right lower quadrant with air bubbles.  I have been asked to
admit her as a general surgeon on-call. 
 
HOSPITAL COURSE:
Sarah was admitted as above and late in the evening.  We started on fluids and her
antibiotics.  She was not systemically ill or toxic.  She did have some tenderness in
the right lower quadrant.  The following morning, then we took her for an open
appendectomy through a lower midline incision.  We removed the appendix and just a small
portion of the distal end of that right fallopian tube.  That came back inflamed without
any cancer.  She had a retrocecal drain into the abscess cavity.  The cultures from the
abscess cavity yet to grow out any specific bacteria.  She has been on cefepime and
Flagyl throughout her hospital stay and has done quite well.  Given her age and overall
health, she has improved quite nicely.  We have been able to remove her Smith catheter
and she is diuresing well on her own.  We have advanced her diet and she was doing fine
yesterday when she ate breakfast; however, she was quite distended and bloated, so we
kept her one more day and she has done great.  She continues to pass significant amount
of flatus.  The abdomen now is without distention, but she is moderately obese.
Incision is healing well without any local signs or symptoms of infection.  The drain
output was quite minimal and serous.  Consequently, we removed that today and covered
that drain site with dry gauze and tape.  The drain site is a little erythematous from
the tubing, but otherwise no infection.  We have removed half of the staples of her
incision.  She is yet to have a bowel movement, but she knows to buy a laxative in a day
or two if she needs help in that regard.  At this point, she is doing well and we are
going to be sending her home with her family. 
 
DISCHARGE PLANS AND MEDICATIONS:
Sarah will be discharged home with Augmentin 875 mg one tablet p.o. b.i.d. for the next
5 days.  Also Flagyl 500 mg 1 p.o. t.i.d. for the next 5 days.  We will give her a
prescription for Norco 5/325 1-2 tablets p.o. q.4-6 hours p.r.n. for severe
postoperative pain.  We will dispense 25 tablets with no refills.  Otherwise, she can
use Tylenol, ibuprofen, or Aleve as needed for pain.  She can resume her chronic
 
    Electronically Signed By: RENÉ JARAMILLO MD  05/22/20 0730
_________________________________________________________________________________________
PATIENT NAME:     SARAH GEORGE                     
MEDICAL RECORD #: [id number]            DISCHARGE SUMMARY             
          ACCT #: [id number]  
DATE OF BIRTH:   [birth year]            REPORT #: [id number]      
PHYSICIAN:        RENÉ JARAMILLO MD             
PCP:              ANTONIA KUNZ MD           
REPORT IS CONFIDENTIAL AND NOT TO BE RELEASED WITHOUT AUTHORIZATION
 
 
                                  [address]  [address]
_________________________________________________________________________________________
                                                                 Signed   
 
 
medications including the fluoxetine and multivitamin.  She can remove the drain site
dressing tomorrow and shower as usual.  She should not immerse herself until the 4th
day.  After 3 or 4 days when there is no drainage from the drain site, she can
discontinue applying Band-Aids.  She can follow a regular diet.  She can perform her
activities of daily living including walking up and down stairs and showering, bathing
as usual.  She should not engage in any heavy pushing, pulling, or lifting over about 20
pounds.  I will have her back in the 
office in 3-5 days for reassessment, removal of the remaining staples.  She has
expressed understanding and agrees to above plan. 
 
 
 
            ________________________________________
            René Jaramillo MD 
 
 
ALB/MODL
Job #:  [id number]
DD:  05/21/2020 10:56:51
DT:  05/21/2020 11:23:14
 
cc:            MD René Odonnell MD
 
 
Copies:  ANTONIA KUNZ MD, ANDREW L MD
~
 
 
 
 
 
 
 
 
 
 
 
 
 
 
    Electronically Signed By: RENÉ JARAMILLO MD  05/22/20 0730
_________________________________________________________________________________________
PATIENT NAME:     SARAH GEORGE                     
MEDICAL RECORD #: [id number]            DISCHARGE SUMMARY             
          ACCT #: [id number]  
DATE OF BIRTH:   [birth year]            REPORT #: [id number]      
PHYSICIAN:        RENÉ JARAMILLO MD             
PCP:              ANTONIA KUNZ MD           
REPORT IS CONFIDENTIAL AND NOT TO BE RELEASED WITHOUT AUTHORIZATION